# Patient Record
Sex: FEMALE | Race: WHITE | Employment: OTHER | URBAN - METROPOLITAN AREA
[De-identification: names, ages, dates, MRNs, and addresses within clinical notes are randomized per-mention and may not be internally consistent; named-entity substitution may affect disease eponyms.]

---

## 2022-10-14 ENCOUNTER — TELEPHONE (OUTPATIENT)
Dept: GASTROENTEROLOGY | Age: 67
End: 2022-10-14

## 2022-10-14 DIAGNOSIS — Z87.891 FORMER SMOKER: ICD-10-CM

## 2022-10-14 DIAGNOSIS — R12 BURNING REFLUX: ICD-10-CM

## 2022-10-14 PROBLEM — R11.0 NAUSEA: Status: ACTIVE | Noted: 2022-10-14

## 2022-10-14 PROBLEM — K44.9 HIATAL HERNIA: Status: ACTIVE | Noted: 2022-10-14

## 2022-10-14 PROBLEM — E11.9 DIABETES (HCC): Status: ACTIVE | Noted: 2022-10-14

## 2022-10-14 RX ORDER — GLIPIZIDE 5 MG/1
10 TABLET, FILM COATED, EXTENDED RELEASE ORAL DAILY
COMMUNITY
Start: 2022-07-07

## 2022-10-14 NOTE — TELEPHONE ENCOUNTER
Medical records request. I spoke with patient, she said she had been seeing Elena LANDEROS at [facility] in [facility]. Will send request for medical records. She said she was in the hospital 3 yrs ago, and was told she had a hiatal hernia. She is currently c/o stomache burning, and nausea. Is taking OTC meds  for reflux and also taking OTC nicorette. Letter faxed to Elena Garces NP for records.

## 2022-10-18 ENCOUNTER — OFFICE VISIT (OUTPATIENT)
Dept: GASTROENTEROLOGY | Age: 67
End: 2022-10-18

## 2022-10-18 VITALS
HEIGHT: 63 IN | SYSTOLIC BLOOD PRESSURE: 102 MMHG | DIASTOLIC BLOOD PRESSURE: 64 MMHG | OXYGEN SATURATION: 98 % | RESPIRATION RATE: 14 BRPM | HEART RATE: 90 BPM | TEMPERATURE: 97.6 F | WEIGHT: 153.4 LBS | BODY MASS INDEX: 27.18 KG/M2

## 2022-10-18 DIAGNOSIS — K21.9 GASTROESOPHAGEAL REFLUX DISEASE, UNSPECIFIED WHETHER ESOPHAGITIS PRESENT: Primary | ICD-10-CM

## 2022-10-18 DIAGNOSIS — R11.0 NAUSEA: ICD-10-CM

## 2022-10-18 RX ORDER — ASCORBIC ACID 250 MG
250 TABLET ORAL
COMMUNITY

## 2022-10-18 RX ORDER — DIPHENHYDRAMINE HCL 25 MG
4 CAPSULE ORAL AS NEEDED
COMMUNITY

## 2022-10-18 RX ORDER — ASPIRIN 81 MG/1
81 TABLET ORAL DAILY
COMMUNITY

## 2022-10-18 RX ORDER — MAGNESIUM AMINO ACID CHELATE 100 MG
1 TABLET ORAL DAILY
COMMUNITY

## 2022-10-18 RX ORDER — MULTIVIT WITH IRON,MINERALS
TABLET ORAL
COMMUNITY

## 2022-10-18 RX ORDER — OMEPRAZOLE 20 MG/1
20 TABLET, DELAYED RELEASE ORAL DAILY
COMMUNITY

## 2022-10-18 NOTE — PROGRESS NOTES
1. Have you been to the ER, urgent care clinic since your last visit? Hospitalized since your last visit? no    2. Have you seen or consulted any other health care providers outside of the [address] [place] [address] since your last visit? Include any pap smears or colon screening.   No   Chief Complaint   Patient presents with    Heartburn     Acid reflux, says hx of Hiatal Hernia    Nausea    New Patient     Visit Vitals  /64 (BP 1 Location: Left upper arm, BP Patient Position: Sitting, BP Cuff Size: Adult)   Pulse 90   Temp 97.6 °F (36.4 °C) (Temporal)   Resp 14   Ht 5' 3\" (1.6 m)   Wt 69.6 kg (153 lb 6.4 oz)   SpO2 98% Comment: room air   BMI 27.17 kg/m²

## 2022-11-17 ENCOUNTER — HOSPITAL ENCOUNTER (EMERGENCY)
Age: 67
Discharge: HOME | End: 2022-11-17
Attending: EMERGENCY MEDICINE
Payer: MEDICARE

## 2022-11-17 ENCOUNTER — APPOINTMENT (OUTPATIENT)
Dept: GENERAL RADIOLOGY | Age: 67
End: 2022-11-17
Attending: EMERGENCY MEDICINE
Payer: MEDICARE

## 2022-11-17 VITALS
OXYGEN SATURATION: 99 % | SYSTOLIC BLOOD PRESSURE: 148 MMHG | HEIGHT: 63 IN | BODY MASS INDEX: 27.11 KG/M2 | TEMPERATURE: 98.7 F | WEIGHT: 153 LBS | DIASTOLIC BLOOD PRESSURE: 72 MMHG | RESPIRATION RATE: 18 BRPM | HEART RATE: 78 BPM

## 2022-11-17 DIAGNOSIS — J10.1 INFLUENZA A: Primary | ICD-10-CM

## 2022-11-17 LAB
ALBUMIN SERPL-MCNC: 2.9 G/DL (ref 3.5–5)
ALBUMIN/GLOB SERPL: 0.7 {RATIO} (ref 1.1–2.2)
ALP SERPL-CCNC: 88 U/L (ref 45–117)
ALT SERPL-CCNC: 14 U/L (ref 12–78)
ANION GAP SERPL CALC-SCNC: 8 MMOL/L (ref 5–15)
AST SERPL W P-5'-P-CCNC: 12 U/L (ref 15–37)
BASOPHILS # BLD: 0 K/UL (ref 0–0.1)
BASOPHILS NFR BLD: 1 % (ref 0–1)
BILIRUB SERPL-MCNC: 0.2 MG/DL (ref 0.2–1)
BUN SERPL-MCNC: 11 MG/DL (ref 6–20)
BUN/CREAT SERPL: 11 (ref 12–20)
CA-I BLD-MCNC: 8.4 MG/DL (ref 8.5–10.1)
CHLORIDE SERPL-SCNC: 97 MMOL/L (ref 97–108)
CO2 SERPL-SCNC: 32 MMOL/L (ref 21–32)
CREAT SERPL-MCNC: 1.02 MG/DL (ref 0.55–1.02)
DIFFERENTIAL METHOD BLD: ABNORMAL
EOSINOPHIL # BLD: 0 K/UL (ref 0–0.4)
EOSINOPHIL NFR BLD: 0 % (ref 0–7)
ERYTHROCYTE [DISTWIDTH] IN BLOOD BY AUTOMATED COUNT: 13.5 % (ref 11.5–14.5)
FLUAV RNA SPEC QL NAA+PROBE: DETECTED
FLUBV RNA SPEC QL NAA+PROBE: NOT DETECTED
GLOBULIN SER CALC-MCNC: 3.9 G/DL (ref 2–4)
GLUCOSE SERPL-MCNC: 158 MG/DL (ref 65–100)
HCT VFR BLD AUTO: 36.1 % (ref 35–47)
HGB BLD-MCNC: 11.7 G/DL (ref 11.5–16)
IMM GRANULOCYTES # BLD AUTO: 0 K/UL (ref 0–0.04)
IMM GRANULOCYTES NFR BLD AUTO: 0 % (ref 0–0.5)
LYMPHOCYTES # BLD: 0.5 K/UL (ref 0.8–3.5)
LYMPHOCYTES NFR BLD: 13 % (ref 12–49)
MCH RBC QN AUTO: 27.2 PG (ref 26–34)
MCHC RBC AUTO-ENTMCNC: 32.4 G/DL (ref 30–36.5)
MCV RBC AUTO: 84 FL (ref 80–99)
MONOCYTES # BLD: 0.6 K/UL (ref 0–1)
MONOCYTES NFR BLD: 17 % (ref 5–13)
NEUTS SEG # BLD: 2.6 K/UL (ref 1.8–8)
NEUTS SEG NFR BLD: 69 % (ref 32–75)
NRBC # BLD: 0 K/UL (ref 0–0.01)
NRBC BLD-RTO: 0 PER 100 WBC
PLATELET # BLD AUTO: 401 K/UL (ref 150–400)
PMV BLD AUTO: 9 FL (ref 8.9–12.9)
POTASSIUM SERPL-SCNC: 3.3 MMOL/L (ref 3.5–5.1)
PROT SERPL-MCNC: 6.8 G/DL (ref 6.4–8.2)
RBC # BLD AUTO: 4.3 M/UL (ref 3.8–5.2)
RBC MORPH BLD: ABNORMAL
SARS-COV-2, COV2: NOT DETECTED
SODIUM SERPL-SCNC: 137 MMOL/L (ref 136–145)
WBC # BLD AUTO: 3.7 K/UL (ref 3.6–11)

## 2022-11-17 PROCEDURE — 85025 COMPLETE CBC W/AUTO DIFF WBC: CPT

## 2022-11-17 PROCEDURE — 71045 X-RAY EXAM CHEST 1 VIEW: CPT

## 2022-11-17 PROCEDURE — 80053 COMPREHEN METABOLIC PANEL: CPT

## 2022-11-17 PROCEDURE — 87636 SARSCOV2 & INF A&B AMP PRB: CPT

## 2022-11-17 PROCEDURE — 99284 EMERGENCY DEPT VISIT MOD MDM: CPT

## 2022-11-17 PROCEDURE — 74011250636 HC RX REV CODE- 250/636: Performed by: EMERGENCY MEDICINE

## 2022-11-17 PROCEDURE — 74011250637 HC RX REV CODE- 250/637: Performed by: EMERGENCY MEDICINE

## 2022-11-17 PROCEDURE — 36415 COLL VENOUS BLD VENIPUNCTURE: CPT

## 2022-11-17 RX ORDER — OSELTAMIVIR PHOSPHATE 75 MG/1
75 CAPSULE ORAL
Status: COMPLETED | OUTPATIENT
Start: 2022-11-17 | End: 2022-11-17

## 2022-11-17 RX ORDER — MAG HYDROX/ALUMINUM HYD/SIMETH 200-200-20
30 SUSPENSION, ORAL (FINAL DOSE FORM) ORAL
Status: DISCONTINUED | OUTPATIENT
Start: 2022-11-17 | End: 2022-11-17 | Stop reason: HOSPADM

## 2022-11-17 RX ORDER — ONDANSETRON 4 MG/1
4 TABLET, FILM COATED ORAL
Qty: 15 TABLET | Refills: 0 | Status: SHIPPED | OUTPATIENT
Start: 2022-11-17

## 2022-11-17 RX ORDER — OSELTAMIVIR PHOSPHATE 75 MG/1
75 CAPSULE ORAL 2 TIMES DAILY
Qty: 10 CAPSULE | Refills: 0 | Status: SHIPPED | OUTPATIENT
Start: 2022-11-17 | End: 2022-11-22

## 2022-11-17 RX ADMIN — POTASSIUM BICARBONATE 50 MEQ: 782 TABLET, EFFERVESCENT ORAL at 14:00

## 2022-11-17 RX ADMIN — ALUMINUM HYDROXIDE, MAGNESIUM HYDROXIDE, AND SIMETHICONE 30 ML: 200; 200; 20 SUSPENSION ORAL at 12:21

## 2022-11-17 RX ADMIN — OSELTAMIVIR PHOSPHATE 75 MG: 75 CAPSULE ORAL at 14:00

## 2022-11-17 RX ADMIN — SODIUM CHLORIDE 1000 ML: 9 INJECTION, SOLUTION INTRAVENOUS at 12:17

## 2022-11-17 NOTE — ED TRIAGE NOTES
Patient  reports they think that she may have covid, pnuemoia or flu due to having a deep cough in her chest with nausea vomiting that started yesterday

## 2022-11-17 NOTE — ED PROVIDER NOTES
EMERGENCY DEPARTMENT HISTORY AND PHYSICAL EXAM      Date: 11/17/2022  Patient Name: Sally Owens    History of Presenting Illness     Chief Complaint   Patient presents with    Nausea       History Provided By: Patient's     HPI: Sally Owens, 75 y.o. female past medical history significant diabetes, patient presents with complaint of cough started yesterday and has had nausea and vomiting with 2 episodes of vomiting since yesterday, patient denies any abdominal pain denies any shortness of breath, no fever no chills    There are no other complaints, changes, or physical findings at this time. PCP: TIGRE Hauser    No current facility-administered medications on file prior to encounter. Current Outpatient Medications on File Prior to Encounter   Medication Sig Dispense Refill    ascorbic acid, vitamin C, (VITAMIN C) 250 mg tablet Take 250 mg by mouth. aspirin delayed-release 81 mg tablet Take 81 mg by mouth daily. She says she takes 2 a day      Magnesium Amino Acid Chelate 100 mg tab Take 1 Tablet by mouth daily. cinnamon bark (CINNAMON PO) Take 1,200 mg by mouth daily. omeprazole (PRILOSEC OTC) 20 mg tablet Take 20 mg by mouth daily. potassium gluconate 2.5 mEq tab potassium 99 mg tablet   Take 1 tablet every day by oral route. nicotine (NICORETTE) 4 mg gum Take 4 mg by mouth as needed for Smoking Cessation. glipiZIDE SR (GLUCOTROL XL) 5 mg CR tablet Take 10 mg by mouth daily. Past History     Past Medical History:  Past Medical History:   Diagnosis Date    Burning reflux 10/14/2022    Diabetes ([address] 10/14/2022    Former smoker 10/14/2022    Using OTC Nicorette. Hiatal hernia 10/14/2022    Nausea 10/14/2022       Past Surgical History:  No past surgical history on file.     Family History:  Family History   Problem Relation Age of Onset    Diabetes Mother     Heart Disease Mother     Hypertension Mother     Heart Disease Father     Hypertension Father     Diabetes Sister     Diabetes Maternal Grandmother        Social History:  Social History     Tobacco Use    Smoking status: Former     Types: Cigarettes    Smokeless tobacco: Never   Vaping Use    Vaping Use: Never used   Substance Use Topics    Alcohol use: Not Currently       Allergies: Allergies   Allergen Reactions    Codeine Unknown (comments)    Penicillins Other (comments)       Review of Systems   Review of Systems   Constitutional:  Negative for chills and fever. HENT:  Negative for rhinorrhea and sore throat. Eyes:  Negative for pain and visual disturbance. Respiratory:  Positive for cough. Negative for shortness of breath. Cardiovascular:  Negative for chest pain and leg swelling. Gastrointestinal:  Positive for diarrhea, nausea and vomiting. Negative for abdominal pain. Endocrine: Negative for polydipsia and polyuria. Genitourinary:  Negative for dysuria and hematuria. Musculoskeletal:  Negative for back pain and neck pain. Skin:  Negative for color change and pallor. Neurological:  Negative for weakness and headaches. Psychiatric/Behavioral:  Negative for agitation and suicidal ideas. Physical Exam   Physical Exam  Vitals and nursing note reviewed. Constitutional:       General: She is not in acute distress. Appearance: She is ill-appearing. She is not toxic-appearing or diaphoretic. HENT:      Head: Normocephalic and atraumatic. Right Ear: Tympanic membrane normal.      Left Ear: Tympanic membrane normal.      Nose: Nose normal. No congestion. Mouth/Throat:      Mouth: Mucous membranes are moist.      Pharynx: Oropharynx is clear. Eyes:      Extraocular Movements: Extraocular movements intact. Conjunctiva/sclera: Conjunctivae normal.      Pupils: Pupils are equal, round, and reactive to light. Cardiovascular:      Rate and Rhythm: Normal rate and regular rhythm. Pulses: Normal pulses. Heart sounds: Normal heart sounds. Pulmonary:      Effort: Pulmonary effort is normal.      Breath sounds: Normal breath sounds. Abdominal:      General: Bowel sounds are normal.      Palpations: Abdomen is soft. Tenderness: There is no abdominal tenderness. Musculoskeletal:         General: No tenderness, deformity or signs of injury. Normal range of motion. Cervical back: Normal range of motion and neck supple. No rigidity or tenderness. Lymphadenopathy:      Cervical: No cervical adenopathy. Skin:     General: Skin is warm and dry. Capillary Refill: Capillary refill takes less than 2 seconds. Findings: No rash. Neurological:      General: No focal deficit present. Mental Status: She is alert and oriented to person, place, and time. Cranial Nerves: No cranial nerve deficit. Sensory: No sensory deficit. Psychiatric:         Mood and Affect: Mood normal.         Behavior: Behavior normal.       Lab and Diagnostic Study Results   Labs -   No results found for this or any previous visit (from the past 12 hour(s)). Radiologic Studies -   @lastxrresult@  CT Results  (Last 48 hours)      None          CXR Results  (Last 48 hours)      None            Medical Decision Making and ED Course   Differential Diagnosis & Medical Decision Making Provider Note:   Vomiting DDx bowel obstruction, Pancreatitis, pneumonia    - I am the first provider for this patient. I reviewed the vital signs, available nursing notes, past medical history, past surgical history, family history and social history. The patients presenting problems have been discussed, and they are in agreement with the care plan formulated and outlined with them. I have encouraged them to ask questions as they arise throughout their visit. Vital Signs-Reviewed the patient's vital signs.   Patient Vitals for the past 12 hrs:   Temp Pulse Resp BP SpO2   11/17/22 0954 98.7 °F (37.1 °C) 83 18 113/65 97 %       ED Course:   ED Course as of 11/17/22 [facility] Nov 17, 2022   1252 Awaiting completion of COVID-19 results [SB]      ED Course User Index  [SB] Jaimee Rosenbaum MD         Procedures   Performed by: Hilary Severs, MD  Procedures      Disposition   Disposition: Condition stable and improved  DC- Adult Discharges: All of the diagnostic tests were reviewed and questions answered. Diagnosis, care plan and treatment options were discussed. The patient understands the instructions and will follow up as directed. The patients results have been reviewed with them. They have been counseled regarding their diagnosis. The patient and spouse/SO verbally convey understanding and agreement of the signs, symptoms, diagnosis, treatment and prognosis and additionally agrees to follow up as recommended with their PCP in 24 - 48 hours. They also agree with the care-plan and convey that all of their questions have been answered. I have also put together some discharge instructions for them that include: 1) educational information regarding their diagnosis, 2) how to care for their diagnosis at home, as well a 3) list of reasons why they would want to return to the ED prior to their follow-up appointment, should their condition change. DISCHARGE PLAN:  1. Current Discharge Medication List        CONTINUE these medications which have NOT CHANGED    Details   ascorbic acid, vitamin C, (VITAMIN C) 250 mg tablet Take 250 mg by mouth. aspirin delayed-release 81 mg tablet Take 81 mg by mouth daily. She says she takes 2 a day      Magnesium Amino Acid Chelate 100 mg tab Take 1 Tablet by mouth daily. cinnamon bark (CINNAMON PO) Take 1,200 mg by mouth daily. omeprazole (PRILOSEC OTC) 20 mg tablet Take 20 mg by mouth daily. potassium gluconate 2.5 mEq tab potassium 99 mg tablet   Take 1 tablet every day by oral route. nicotine (NICORETTE) 4 mg gum Take 4 mg by mouth as needed for Smoking Cessation.       glipiZIDE SR (GLUCOTROL XL) 5 mg CR tablet Take 10 mg by mouth daily. 2.   Follow-up Information    None       3. Return to ED if worse   4. Current Discharge Medication List         Remove if admitted/transferred    Diagnosis/Clinical Impression     Clinical Impression: No diagnosis found. Attestations: Marianela MCINTOSH MD, am the primary clinician of record. Please note that this dictation was completed with [employer], the computer voice recognition software. Quite often unanticipated grammatical, syntax, homophones, and other interpretive errors are inadvertently transcribed by the computer software. Please disregard these errors. Please excuse any errors that have escaped final proofreading. Thank you.

## 2022-12-07 ENCOUNTER — APPOINTMENT (OUTPATIENT)
Dept: GENERAL RADIOLOGY | Age: 67
End: 2022-12-07
Attending: EMERGENCY MEDICINE
Payer: MEDICARE

## 2022-12-07 ENCOUNTER — HOSPITAL ENCOUNTER (INPATIENT)
Age: 67
LOS: 2 days | Discharge: HOME HEALTH | End: 2022-12-09
Attending: EMERGENCY MEDICINE | Admitting: HOSPITALIST
Payer: MEDICARE

## 2022-12-07 DIAGNOSIS — J10.1 INFLUENZA A: ICD-10-CM

## 2022-12-07 DIAGNOSIS — R53.81 MALAISE AND FATIGUE: ICD-10-CM

## 2022-12-07 DIAGNOSIS — E87.8 ELECTROLYTE ABNORMALITY: ICD-10-CM

## 2022-12-07 DIAGNOSIS — R53.83 MALAISE AND FATIGUE: ICD-10-CM

## 2022-12-07 DIAGNOSIS — E86.0 DEHYDRATION: Primary | ICD-10-CM

## 2022-12-07 PROBLEM — E83.42 HYPOMAGNESEMIA: Status: ACTIVE | Noted: 2022-12-07

## 2022-12-07 PROBLEM — E87.6 HYPOKALEMIA: Status: ACTIVE | Noted: 2022-12-07

## 2022-12-07 PROBLEM — E88.09 HYPOALBUMINEMIA: Status: ACTIVE | Noted: 2022-12-07

## 2022-12-07 PROBLEM — N39.0 UTI (URINARY TRACT INFECTION): Status: ACTIVE | Noted: 2022-12-07

## 2022-12-07 PROBLEM — R53.1 GENERAL WEAKNESS: Status: ACTIVE | Noted: 2022-12-07

## 2022-12-07 PROBLEM — D72.829 LEUCOCYTOSIS: Status: ACTIVE | Noted: 2022-12-07

## 2022-12-07 LAB
ALBUMIN SERPL-MCNC: 2.2 G/DL (ref 3.5–5)
ALBUMIN/GLOB SERPL: 0.4 {RATIO} (ref 1.1–2.2)
ALP SERPL-CCNC: 136 U/L (ref 45–117)
ALT SERPL-CCNC: 12 U/L (ref 12–78)
ANION GAP SERPL CALC-SCNC: 6 MMOL/L (ref 5–15)
APPEARANCE UR: CLEAR
AST SERPL W P-5'-P-CCNC: 12 U/L (ref 15–37)
BACTERIA URNS QL MICRO: ABNORMAL /HPF
BASOPHILS # BLD: 0 K/UL (ref 0–0.1)
BASOPHILS NFR BLD: 0 % (ref 0–1)
BILIRUB SERPL-MCNC: 0.5 MG/DL (ref 0.2–1)
BILIRUB UR QL: NEGATIVE
BNP SERPL-MCNC: 531 PG/ML
BUN SERPL-MCNC: 11 MG/DL (ref 6–20)
BUN/CREAT SERPL: 14 (ref 12–20)
CA-I BLD-MCNC: 8.8 MG/DL (ref 8.5–10.1)
CHLORIDE SERPL-SCNC: 92 MMOL/L (ref 97–108)
CO2 SERPL-SCNC: 33 MMOL/L (ref 21–32)
COLOR UR: ABNORMAL
CREAT SERPL-MCNC: 0.78 MG/DL (ref 0.55–1.02)
DIFFERENTIAL METHOD BLD: ABNORMAL
EOSINOPHIL # BLD: 0 K/UL (ref 0–0.4)
EOSINOPHIL NFR BLD: 0 % (ref 0–7)
ERYTHROCYTE [DISTWIDTH] IN BLOOD BY AUTOMATED COUNT: 13.8 % (ref 11.5–14.5)
EST. AVERAGE GLUCOSE BLD GHB EST-MCNC: 260 MG/DL
FLUAV AG NPH QL IA: NEGATIVE
FLUAV RNA SPEC QL NAA+PROBE: NOT DETECTED
FLUBV AG NOSE QL IA: NEGATIVE
FLUBV RNA SPEC QL NAA+PROBE: NOT DETECTED
GLOBULIN SER CALC-MCNC: 5.1 G/DL (ref 2–4)
GLUCOSE BLD STRIP.AUTO-MCNC: 211 MG/DL (ref 65–100)
GLUCOSE SERPL-MCNC: 186 MG/DL (ref 65–100)
GLUCOSE UR STRIP.AUTO-MCNC: 250 MG/DL
HBA1C MFR BLD: 10.7 % (ref 4–5.6)
HCT VFR BLD AUTO: 34.4 % (ref 35–47)
HGB BLD-MCNC: 11.1 G/DL (ref 11.5–16)
HGB UR QL STRIP: NEGATIVE
IMM GRANULOCYTES # BLD AUTO: 0.1 K/UL (ref 0–0.04)
IMM GRANULOCYTES NFR BLD AUTO: 1 % (ref 0–0.5)
KETONES UR QL STRIP.AUTO: NEGATIVE MG/DL
LACTATE SERPL-SCNC: 1.4 MMOL/L (ref 0.4–2)
LEUKOCYTE ESTERASE UR QL STRIP.AUTO: ABNORMAL
LYMPHOCYTES # BLD: 2.5 K/UL (ref 0.8–3.5)
LYMPHOCYTES NFR BLD: 17 % (ref 12–49)
MAGNESIUM SERPL-MCNC: 1.1 MG/DL (ref 1.6–2.4)
MCH RBC QN AUTO: 27.1 PG (ref 26–34)
MCHC RBC AUTO-ENTMCNC: 32.3 G/DL (ref 30–36.5)
MCV RBC AUTO: 83.9 FL (ref 80–99)
MONOCYTES # BLD: 1.2 K/UL (ref 0–1)
MONOCYTES NFR BLD: 8 % (ref 5–13)
NEUTS SEG # BLD: 10.9 K/UL (ref 1.8–8)
NEUTS SEG NFR BLD: 74 % (ref 32–75)
NITRITE UR QL STRIP.AUTO: NEGATIVE
NRBC # BLD: 0 K/UL (ref 0–0.01)
NRBC BLD-RTO: 0 PER 100 WBC
PERFORMED BY, TECHID: ABNORMAL
PH UR STRIP: 7 [PH] (ref 5–8)
PLATELET # BLD AUTO: 590 K/UL (ref 150–400)
PMV BLD AUTO: 8.5 FL (ref 8.9–12.9)
POTASSIUM SERPL-SCNC: 2.9 MMOL/L (ref 3.5–5.1)
PROT SERPL-MCNC: 7.3 G/DL (ref 6.4–8.2)
PROT UR STRIP-MCNC: 30 MG/DL
RBC # BLD AUTO: 4.1 M/UL (ref 3.8–5.2)
RBC #/AREA URNS HPF: ABNORMAL /HPF (ref 0–3)
RSV AG NPH QL IA: POSITIVE
SARS-COV-2, COV2: NOT DETECTED
SODIUM SERPL-SCNC: 131 MMOL/L (ref 136–145)
SP GR UR REFRACTOMETRY: 1.01 (ref 1–1.03)
TROPONIN-HIGH SENSITIVITY: 10 NG/L (ref 0–51)
UA: UC IF INDICATED,UAUC: ABNORMAL
UROBILINOGEN UR QL STRIP.AUTO: 1 EU/DL (ref 0.2–1)
WBC # BLD AUTO: 14.7 K/UL (ref 3.6–11)
WBC URNS QL MICRO: ABNORMAL /HPF (ref 0–5)

## 2022-12-07 PROCEDURE — 96374 THER/PROPH/DIAG INJ IV PUSH: CPT

## 2022-12-07 PROCEDURE — 74011636637 HC RX REV CODE- 636/637: Performed by: HOSPITALIST

## 2022-12-07 PROCEDURE — 84484 ASSAY OF TROPONIN QUANT: CPT

## 2022-12-07 PROCEDURE — 81001 URINALYSIS AUTO W/SCOPE: CPT

## 2022-12-07 PROCEDURE — 87086 URINE CULTURE/COLONY COUNT: CPT

## 2022-12-07 PROCEDURE — 74011250637 HC RX REV CODE- 250/637: Performed by: HOSPITALIST

## 2022-12-07 PROCEDURE — 74011250636 HC RX REV CODE- 250/636: Performed by: HOSPITALIST

## 2022-12-07 PROCEDURE — 83735 ASSAY OF MAGNESIUM: CPT

## 2022-12-07 PROCEDURE — 71045 X-RAY EXAM CHEST 1 VIEW: CPT

## 2022-12-07 PROCEDURE — 74011250637 HC RX REV CODE- 250/637: Performed by: EMERGENCY MEDICINE

## 2022-12-07 PROCEDURE — 85025 COMPLETE CBC W/AUTO DIFF WBC: CPT

## 2022-12-07 PROCEDURE — 65270000029 HC RM PRIVATE

## 2022-12-07 PROCEDURE — 99285 EMERGENCY DEPT VISIT HI MDM: CPT

## 2022-12-07 PROCEDURE — 74011000250 HC RX REV CODE- 250: Performed by: HOSPITALIST

## 2022-12-07 PROCEDURE — 74011250636 HC RX REV CODE- 250/636: Performed by: EMERGENCY MEDICINE

## 2022-12-07 PROCEDURE — 82962 GLUCOSE BLOOD TEST: CPT

## 2022-12-07 PROCEDURE — 87804 INFLUENZA ASSAY W/OPTIC: CPT

## 2022-12-07 PROCEDURE — 87807 RSV ASSAY W/OPTIC: CPT

## 2022-12-07 PROCEDURE — 83036 HEMOGLOBIN GLYCOSYLATED A1C: CPT

## 2022-12-07 PROCEDURE — 87040 BLOOD CULTURE FOR BACTERIA: CPT

## 2022-12-07 PROCEDURE — 83605 ASSAY OF LACTIC ACID: CPT

## 2022-12-07 PROCEDURE — 83880 ASSAY OF NATRIURETIC PEPTIDE: CPT

## 2022-12-07 PROCEDURE — 87636 SARSCOV2 & INF A&B AMP PRB: CPT

## 2022-12-07 PROCEDURE — 93005 ELECTROCARDIOGRAM TRACING: CPT

## 2022-12-07 PROCEDURE — 80053 COMPREHEN METABOLIC PANEL: CPT

## 2022-12-07 RX ORDER — LANOLIN ALCOHOL/MO/W.PET/CERES
400 CREAM (GRAM) TOPICAL DAILY
COMMUNITY

## 2022-12-07 RX ORDER — DIPHENHYDRAMINE HYDROCHLORIDE 50 MG/ML
25 INJECTION, SOLUTION INTRAMUSCULAR; INTRAVENOUS
Status: COMPLETED | OUTPATIENT
Start: 2022-12-07 | End: 2022-12-07

## 2022-12-07 RX ORDER — SODIUM CHLORIDE 0.9 % (FLUSH) 0.9 %
5-40 SYRINGE (ML) INJECTION AS NEEDED
Status: DISCONTINUED | OUTPATIENT
Start: 2022-12-07 | End: 2022-12-09 | Stop reason: HOSPADM

## 2022-12-07 RX ORDER — POLYETHYLENE GLYCOL 3350 17 G/17G
17 POWDER, FOR SOLUTION ORAL DAILY PRN
Status: DISCONTINUED | OUTPATIENT
Start: 2022-12-07 | End: 2022-12-09 | Stop reason: HOSPADM

## 2022-12-07 RX ORDER — SODIUM CHLORIDE AND POTASSIUM CHLORIDE 150; 900 MG/100ML; MG/100ML
INJECTION, SOLUTION INTRAVENOUS CONTINUOUS
Status: DISCONTINUED | OUTPATIENT
Start: 2022-12-07 | End: 2022-12-09

## 2022-12-07 RX ORDER — METFORMIN HYDROCHLORIDE 1000 MG/1
1000 TABLET ORAL 2 TIMES DAILY WITH MEALS
COMMUNITY

## 2022-12-07 RX ORDER — LANOLIN ALCOHOL/MO/W.PET/CERES
400 CREAM (GRAM) TOPICAL DAILY
Status: DISCONTINUED | OUTPATIENT
Start: 2022-12-08 | End: 2022-12-07

## 2022-12-07 RX ORDER — METOCLOPRAMIDE HYDROCHLORIDE 5 MG/ML
5 INJECTION INTRAMUSCULAR; INTRAVENOUS EVERY 6 HOURS
Status: DISCONTINUED | OUTPATIENT
Start: 2022-12-07 | End: 2022-12-07

## 2022-12-07 RX ORDER — PANTOPRAZOLE SODIUM 40 MG/1
40 TABLET, DELAYED RELEASE ORAL
Status: DISCONTINUED | OUTPATIENT
Start: 2022-12-08 | End: 2022-12-09 | Stop reason: HOSPADM

## 2022-12-07 RX ORDER — ACETAMINOPHEN 325 MG/1
650 TABLET ORAL
Status: DISCONTINUED | OUTPATIENT
Start: 2022-12-07 | End: 2022-12-09 | Stop reason: HOSPADM

## 2022-12-07 RX ORDER — ACETAMINOPHEN 500 MG
1000 TABLET ORAL ONCE
Status: COMPLETED | OUTPATIENT
Start: 2022-12-07 | End: 2022-12-07

## 2022-12-07 RX ORDER — INSULIN LISPRO 100 [IU]/ML
INJECTION, SOLUTION INTRAVENOUS; SUBCUTANEOUS
Status: DISCONTINUED | OUTPATIENT
Start: 2022-12-07 | End: 2022-12-07

## 2022-12-07 RX ORDER — MAGNESIUM SULFATE 100 %
4 CRYSTALS MISCELLANEOUS AS NEEDED
Status: DISCONTINUED | OUTPATIENT
Start: 2022-12-07 | End: 2022-12-09 | Stop reason: HOSPADM

## 2022-12-07 RX ORDER — DEXTROSE 50 % IN WATER (D50W) INTRAVENOUS SYRINGE
25-50 AS NEEDED
Status: DISCONTINUED | OUTPATIENT
Start: 2022-12-07 | End: 2022-12-09 | Stop reason: HOSPADM

## 2022-12-07 RX ORDER — FLUTICASONE PROPIONATE 50 MCG
2 SPRAY, SUSPENSION (ML) NASAL DAILY
Status: DISCONTINUED | OUTPATIENT
Start: 2022-12-07 | End: 2022-12-09 | Stop reason: HOSPADM

## 2022-12-07 RX ORDER — POTASSIUM CHLORIDE 20 MEQ/1
40 TABLET, EXTENDED RELEASE ORAL
Status: COMPLETED | OUTPATIENT
Start: 2022-12-07 | End: 2022-12-07

## 2022-12-07 RX ORDER — LEVOFLOXACIN 5 MG/ML
750 INJECTION, SOLUTION INTRAVENOUS EVERY 24 HOURS
Status: DISCONTINUED | OUTPATIENT
Start: 2022-12-07 | End: 2022-12-09 | Stop reason: HOSPADM

## 2022-12-07 RX ORDER — MAG HYDROX/ALUMINUM HYD/SIMETH 200-200-20
30 SUSPENSION, ORAL (FINAL DOSE FORM) ORAL
Status: DISCONTINUED | OUTPATIENT
Start: 2022-12-08 | End: 2022-12-09 | Stop reason: HOSPADM

## 2022-12-07 RX ORDER — ATORVASTATIN CALCIUM 10 MG/1
10 TABLET, FILM COATED ORAL DAILY
Status: DISCONTINUED | OUTPATIENT
Start: 2022-12-08 | End: 2022-12-09 | Stop reason: HOSPADM

## 2022-12-07 RX ORDER — LANOLIN ALCOHOL/MO/W.PET/CERES
400 CREAM (GRAM) TOPICAL 2 TIMES DAILY
Status: DISCONTINUED | OUTPATIENT
Start: 2022-12-07 | End: 2022-12-09 | Stop reason: HOSPADM

## 2022-12-07 RX ORDER — MELATONIN
1000 DAILY
COMMUNITY

## 2022-12-07 RX ORDER — SODIUM CHLORIDE 0.9 % (FLUSH) 0.9 %
5-40 SYRINGE (ML) INJECTION EVERY 8 HOURS
Status: DISCONTINUED | OUTPATIENT
Start: 2022-12-07 | End: 2022-12-09 | Stop reason: HOSPADM

## 2022-12-07 RX ORDER — MORPHINE SULFATE 2 MG/ML
2 INJECTION, SOLUTION INTRAMUSCULAR; INTRAVENOUS ONCE
Status: DISCONTINUED | OUTPATIENT
Start: 2022-12-07 | End: 2022-12-07

## 2022-12-07 RX ORDER — ACETAMINOPHEN 650 MG/1
650 SUPPOSITORY RECTAL
Status: DISCONTINUED | OUTPATIENT
Start: 2022-12-07 | End: 2022-12-09 | Stop reason: HOSPADM

## 2022-12-07 RX ORDER — CETIRIZINE HYDROCHLORIDE 10 MG/1
10 TABLET ORAL EVERY EVENING
Status: DISCONTINUED | OUTPATIENT
Start: 2022-12-07 | End: 2022-12-09 | Stop reason: HOSPADM

## 2022-12-07 RX ORDER — METFORMIN HYDROCHLORIDE 500 MG/1
1000 TABLET ORAL 2 TIMES DAILY WITH MEALS
Status: DISCONTINUED | OUTPATIENT
Start: 2022-12-07 | End: 2022-12-09 | Stop reason: HOSPADM

## 2022-12-07 RX ORDER — ENOXAPARIN SODIUM 100 MG/ML
40 INJECTION SUBCUTANEOUS DAILY
Status: DISCONTINUED | OUTPATIENT
Start: 2022-12-08 | End: 2022-12-09 | Stop reason: HOSPADM

## 2022-12-07 RX ORDER — METOPROLOL SUCCINATE 25 MG/1
25 TABLET, EXTENDED RELEASE ORAL DAILY
Status: DISCONTINUED | OUTPATIENT
Start: 2022-12-08 | End: 2022-12-09 | Stop reason: HOSPADM

## 2022-12-07 RX ORDER — MAGNESIUM SULFATE 1 G/100ML
1 INJECTION INTRAVENOUS
Status: COMPLETED | OUTPATIENT
Start: 2022-12-07 | End: 2022-12-07

## 2022-12-07 RX ORDER — MELATONIN
1000 DAILY
Status: DISCONTINUED | OUTPATIENT
Start: 2022-12-08 | End: 2022-12-09 | Stop reason: HOSPADM

## 2022-12-07 RX ORDER — LIDOCAINE HYDROCHLORIDE 20 MG/ML
10 INJECTION, SOLUTION EPIDURAL; INFILTRATION; INTRACAUDAL; PERINEURAL ONCE
Status: DISCONTINUED | OUTPATIENT
Start: 2022-12-07 | End: 2022-12-07

## 2022-12-07 RX ORDER — ONDANSETRON 2 MG/ML
4 INJECTION INTRAMUSCULAR; INTRAVENOUS
Status: DISCONTINUED | OUTPATIENT
Start: 2022-12-07 | End: 2022-12-09 | Stop reason: HOSPADM

## 2022-12-07 RX ORDER — METOPROLOL SUCCINATE 25 MG/1
25 TABLET, EXTENDED RELEASE ORAL DAILY
COMMUNITY

## 2022-12-07 RX ORDER — BENZONATATE 100 MG/1
100 CAPSULE ORAL
Status: DISCONTINUED | OUTPATIENT
Start: 2022-12-07 | End: 2022-12-09 | Stop reason: HOSPADM

## 2022-12-07 RX ORDER — OMEPRAZOLE 20 MG/1
20 CAPSULE, DELAYED RELEASE ORAL DAILY
COMMUNITY

## 2022-12-07 RX ORDER — ONDANSETRON 2 MG/ML
4 INJECTION INTRAMUSCULAR; INTRAVENOUS ONCE
Status: COMPLETED | OUTPATIENT
Start: 2022-12-07 | End: 2022-12-07

## 2022-12-07 RX ORDER — BISMUTH SUBSALICYLATE 262 MG
1 TABLET,CHEWABLE ORAL DAILY
COMMUNITY

## 2022-12-07 RX ORDER — ATORVASTATIN CALCIUM 10 MG/1
10 TABLET, FILM COATED ORAL DAILY
COMMUNITY

## 2022-12-07 RX ADMIN — METFORMIN HYDROCHLORIDE 1000 MG: 500 TABLET ORAL at 16:47

## 2022-12-07 RX ADMIN — MAGNESIUM OXIDE 400 MG: 400 TABLET ORAL at 21:41

## 2022-12-07 RX ADMIN — CETIRIZINE HYDROCHLORIDE 10 MG: 10 TABLET, FILM COATED ORAL at 17:08

## 2022-12-07 RX ADMIN — MAGNESIUM SULFATE HEPTAHYDRATE 1 G: 1 INJECTION, SOLUTION INTRAVENOUS at 21:43

## 2022-12-07 RX ADMIN — FLUTICASONE PROPIONATE 2 SPRAY: 50 SPRAY, METERED NASAL at 16:52

## 2022-12-07 RX ADMIN — ONDANSETRON 4 MG: 2 INJECTION INTRAMUSCULAR; INTRAVENOUS at 10:35

## 2022-12-07 RX ADMIN — SODIUM CHLORIDE, PRESERVATIVE FREE 10 ML: 5 INJECTION INTRAVENOUS at 21:43

## 2022-12-07 RX ADMIN — POTASSIUM CHLORIDE AND SODIUM CHLORIDE: 900; 150 INJECTION, SOLUTION INTRAVENOUS at 14:21

## 2022-12-07 RX ADMIN — DIPHENHYDRAMINE HYDROCHLORIDE 25 MG: 50 INJECTION, SOLUTION INTRAMUSCULAR; INTRAVENOUS at 12:41

## 2022-12-07 RX ADMIN — METOCLOPRAMIDE 5 MG: 5 INJECTION, SOLUTION INTRAMUSCULAR; INTRAVENOUS at 12:41

## 2022-12-07 RX ADMIN — SODIUM CHLORIDE, PRESERVATIVE FREE 10 ML: 5 INJECTION INTRAVENOUS at 16:24

## 2022-12-07 RX ADMIN — LEVOFLOXACIN 750 MG: 750 INJECTION, SOLUTION INTRAVENOUS at 16:24

## 2022-12-07 RX ADMIN — POTASSIUM CHLORIDE 40 MEQ: 1500 TABLET, EXTENDED RELEASE ORAL at 12:37

## 2022-12-07 RX ADMIN — ACETAMINOPHEN 1000 MG: 500 TABLET ORAL at 10:43

## 2022-12-07 RX ADMIN — SODIUM CHLORIDE 500 ML: 9 INJECTION, SOLUTION INTRAVENOUS at 12:37

## 2022-12-07 RX ADMIN — SODIUM CHLORIDE, PRESERVATIVE FREE 10 ML: 5 INJECTION INTRAVENOUS at 14:22

## 2022-12-07 RX ADMIN — METOCLOPRAMIDE 5 MG: 5 INJECTION, SOLUTION INTRAMUSCULAR; INTRAVENOUS at 17:08

## 2022-12-07 RX ADMIN — INSULIN LISPRO 3 UNITS: 100 INJECTION, SOLUTION INTRAVENOUS; SUBCUTANEOUS at 15:38

## 2022-12-07 RX ADMIN — BENZONATATE 100 MG: 100 CAPSULE ORAL at 10:35

## 2022-12-07 RX ADMIN — MAGNESIUM SULFATE HEPTAHYDRATE 1 G: 1 INJECTION, SOLUTION INTRAVENOUS at 12:37

## 2022-12-07 NOTE — ED NOTES
TRANSFER - OUT REPORT:    Verbal report given to Gloria  on Serina Guerra  being transferred to [facility] for routine progression of care       Report consisted of patients Situation, Background, Assessment and   Recommendations(SBAR). Information from the following report(s) ED Summary was reviewed with the receiving nurse. Opportunity for questions and clarification was provided.       Patient transported with:   Registered Nurse

## 2022-12-07 NOTE — ROUTINE PROCESS
TRANSFER - IN REPORT:    Verbal report received from Yasmine(name) on [facility]  being received from ER(unit) for routine progression of care      Report consisted of patients Situation, Background, Assessment and   Recommendations(SBAR). Information from the following report(s) SBAR was reviewed with the receiving nurse. Opportunity for questions and clarification was provided. Assessment completed upon patients arrival to unit and care assumed.

## 2022-12-07 NOTE — H&P
History and Physical      Chief Complaints:     Chief Complaint   Patient presents with    Fatigue    Flu Like Symptoms         Subjective:     Serina Guerra is a 75 y.o. female followed by Teri Bermudez  and  has a past medical history of   GERD, hiatal hernia, diabetes, hypertension, hyperlipidemia who presents from home with increasing fatigue. Patient was diagnosed with flu a positive on 11/17 and since that time has had fatigue and aching all over denies any fevers but is having some cough and sinus pain as well as nausea with associated heartburn-like symptoms. She had some episodes of diarrhea but very few and she also notes that her  had similar symptoms and found to be flu positive but is doing better. On evaluation emergency room she noted to have a sodium 131 potassium 2.9 and magnesium of 1.1 albumin of 2.2 and a BUN/creatinine 11/0.7 8 repeat COVID/flu testing is negative her chest x-ray was also negative but with her noted leukocytosis initially urinalysis obtained after arrival to the care and noted to have 2+ bacteria and moderate leukocytes increased protein and glucose also noted. So patient was referred for admission to remote telemetry for UTI, hypomagnesemia, dehydration, hypokalemia generalized weakness      Past Medical History:   Diagnosis Date    Burning reflux 10/14/2022    Diabetes ([facility] [address] 10/14/2022    Former smoker 10/14/2022    Using OTC Nicorette. Hiatal hernia 10/14/2022    Nausea 10/14/2022      No past surgical history on file.   Family History   Problem Relation Age of Onset    Diabetes Mother     Heart Disease Mother     Hypertension Mother     Heart Disease Father     Hypertension Father     Diabetes Sister     Diabetes Maternal Grandmother       Social History     Tobacco Use    Smoking status: Former     Types: Cigarettes    Smokeless tobacco: Never   Substance Use Topics    Alcohol use: Not Currently       Prior to Admission medications    Medication Sig Start Date End Date Taking? Authorizing Provider   magnesium oxide (MAG-OX) 400 mg tablet Take 400 mg by mouth daily. Yes Provider, Historical   omeprazole (PRILOSEC) 20 mg capsule Take 20 mg by mouth daily. Indications: gastroesophageal reflux disease   Yes Provider, Historical   multivitamin (ONE A DAY) tablet Take 1 Tablet by mouth daily. Yes Provider, Historical   cholecalciferol (Vitamin D3) (1000 Units /25 mcg) tablet Take 1,000 Units by mouth daily. Yes Provider, Historical   metFORMIN (GLUCOPHAGE) 1,000 mg tablet Take 1,000 mg by mouth two (2) times daily (with meals). Rx ending 12/12/22   Yes Provider, Historical   atorvastatin (LIPITOR) 10 mg tablet Take 10 mg by mouth daily. Yes Provider, Historical   metoprolol succinate (TOPROL-XL) 25 mg XL tablet Take 25 mg by mouth daily. Yes Provider, Historical   omeprazole (PRILOSEC OTC) 20 mg tablet Take 20 mg by mouth daily. Yes Provider, Historical   nicotine (NICORETTE) 4 mg gum Take 4 mg by mouth as needed for Smoking Cessation. Yes Provider, Historical   glipiZIDE SR (GLUCOTROL XL) 5 mg CR tablet Take 10 mg by mouth daily. 7/7/22  Yes Provider, Historical   ondansetron hcl (Zofran) 4 mg tablet Take 1 Tablet by mouth every eight (8) hours as needed for Nausea or Vomiting. Patient not taking: Reported on 12/7/2022 11/17/22   Melanie Celis MD   ascorbic acid, vitamin C, (VITAMIN C) 250 mg tablet Take 250 mg by mouth. Patient not taking: Reported on 12/7/2022    Provider, Historical   aspirin delayed-release 81 mg tablet Take 81 mg by mouth daily. She says she takes 2 a day  Patient not taking: Reported on 12/7/2022    Provider, Historical   Magnesium Amino Acid Chelate 100 mg tab Take 1 Tablet by mouth daily. Patient not taking: Reported on 12/7/2022    Provider, Historical   cinnamon bark (CINNAMON PO) Take 1,200 mg by mouth daily.     Provider, Historical   potassium gluconate 2.5 mEq tab potassium 99 mg tablet   Take 1 tablet every day by oral route. Patient not taking: Reported on 12/7/2022    Provider, Historical     Allergies   Allergen Reactions    Codeine Unknown (comments)    Penicillins Other (comments)        Review of Systems:  Review of Systems   Constitutional:  Positive for appetite change and fatigue. HENT: Negative. Respiratory:  Positive for cough. Cardiovascular: Negative. Gastrointestinal:  Positive for abdominal pain and nausea. Endocrine: Negative. Genitourinary: Negative. Musculoskeletal: Negative. Allergic/Immunologic: Negative. Neurological: Negative. Hematological: Negative. Psychiatric/Behavioral: Negative. All other systems reviewed and are negative. Objective:     Vitals:  Visit Vitals  /75   Pulse 86   Temp 98.4 °F (36.9 °C)   Resp 18   Ht 5' 3\" (1.6 m)   Wt 66.1 kg (145 lb 11.2 oz)   SpO2 92%   Breastfeeding No   BMI 25.81 kg/m²       Physical Exam:  General: Alert, cooperative, no distress. Head:  Normocephalic, without obvious abnormality, atraumatic. Eyes:  Conjunctivae/corneas clear. Pupils equal, round, reactive to light. Extraocular movements intact. Lungs:  Clear to auscultation bilaterally, no wheezes, crackles  Chest wall: No tenderness or deformity. Heart:  Regular rate and rhythm, S1, S2 normal, no murmur, click, rub, or gallop. Abdomen:   Soft, non-tender. Bowel sounds normal. No masses. No organomegaly. Back:  No spine tenderness to palpation  Extremities: Extremities normal, atraumatic, no cyanosis or edema. Pulses: Symmetric all extremities. Skin: Skin color, texture, turgor normal.   Lymph nodes: Cervical nodes normal.  Neurologic: CNII-XII intact. Normal strength, sensation, and reflexes throughout.       Labs:  Recent Results (from the past 24 hour(s))   EKG, 12 LEAD, INITIAL    Collection Time: 12/07/22  9:55 AM   Result Value Ref Range    Ventricular Rate 81 BPM    Atrial Rate 81 BPM    P-R Interval 143 ms    QRS Duration 115 ms    Q-T Interval 419 ms    QTC Calculation (Bezet) 487 ms    Calculated P Axis -10 degrees    Calculated R Axis -51 degrees    Calculated T Axis 43 degrees    Diagnosis       Sinus rhythm  Probable left atrial enlargement  Incomplete RBBB and LAFB  Left ventricular hypertrophy     CBC WITH AUTOMATED DIFF    Collection Time: 12/07/22 10:00 AM   Result Value Ref Range    WBC 14.7 (H) 3.6 - 11.0 K/uL    RBC 4.10 3.80 - 5.20 M/uL    HGB 11.1 (L) 11.5 - 16.0 g/dL    HCT 34.4 (L) 35.0 - 47.0 %    MCV 83.9 80.0 - 99.0 FL    MCH 27.1 26.0 - 34.0 PG    MCHC 32.3 30.0 - 36.5 g/dL    RDW 13.8 11.5 - 14.5 %    PLATELET 277 (H) 204 - 400 K/uL    MPV 8.5 (L) 8.9 - 12.9 FL    NRBC 0.0 0.0  WBC    ABSOLUTE NRBC 0.00 0.00 - 0.01 K/uL    NEUTROPHILS 74 32 - 75 %    LYMPHOCYTES 17 12 - 49 %    MONOCYTES 8 5 - 13 %    EOSINOPHILS 0 0 - 7 %    BASOPHILS 0 0 - 1 %    IMMATURE GRANULOCYTES 1 (H) 0 - 0.5 %    ABS. NEUTROPHILS 10.9 (H) 1.8 - 8.0 K/UL    ABS. LYMPHOCYTES 2.5 0.8 - 3.5 K/UL    ABS. MONOCYTES 1.2 (H) 0.0 - 1.0 K/UL    ABS. EOSINOPHILS 0.0 0.0 - 0.4 K/UL    ABS. BASOPHILS 0.0 0.0 - 0.1 K/UL    ABS. IMM. GRANS. 0.1 (H) 0.00 - 0.04 K/UL    DF AUTOMATED     METABOLIC PANEL, COMPREHENSIVE    Collection Time: 12/07/22 10:00 AM   Result Value Ref Range    Sodium 131 (L) 136 - 145 mmol/L    Potassium 2.9 (L) 3.5 - 5.1 mmol/L    Chloride 92 (L) 97 - 108 mmol/L    CO2 33 (H) 21 - 32 mmol/L    Anion gap 6 5 - 15 mmol/L    Glucose 186 (H) 65 - 100 mg/dL    BUN 11 6 - 20 mg/dL    Creatinine 0.78 0.55 - 1.02 mg/dL    BUN/Creatinine ratio 14 12 - 20      eGFR >60 >60 ml/min/1.73m2    Calcium 8.8 8.5 - 10.1 mg/dL    Bilirubin, total 0.5 0.2 - 1.0 mg/dL    AST (SGOT) 12 (L) 15 - 37 U/L    ALT (SGPT) 12 12 - 78 U/L    Alk.  phosphatase 136 (H) 45 - 117 U/L    Protein, total 7.3 6.4 - 8.2 g/dL    Albumin 2.2 (L) 3.5 - 5.0 g/dL    Globulin 5.1 (H) 2.0 - 4.0 g/dL    A-G Ratio 0.4 (L) 1.1 - 2.2     MAGNESIUM    Collection Time: 12/07/22 10:00 AM Result Value Ref Range    Magnesium 1.1 (L) 1.6 - 2.4 mg/dL   TROPONIN-HIGH SENSITIVITY    Collection Time: 12/07/22 10:00 AM   Result Value Ref Range    Troponin-High Sensitivity 10 0 - 51 ng/L   LACTIC ACID    Collection Time: 12/07/22 10:00 AM   Result Value Ref Range    Lactic acid 1.4 0.4 - 2.0 mmol/L   NT-PRO BNP    Collection Time: 12/07/22 10:00 AM   Result Value Ref Range    NT pro- (H) <125 pg/mL   URINALYSIS W/ REFLEX CULTURE    Collection Time: 12/07/22  1:27 PM    Specimen: Urine   Result Value Ref Range    Color Yellow/Straw      Appearance Clear Clear      Specific gravity 1.010 1.003 - 1.030      pH (UA) 7.0 5.0 - 8.0      Protein 30 (A) Negative mg/dL    Glucose 250 (A) Negative mg/dL    Ketone Negative Negative mg/dL    Bilirubin Negative Negative      Blood Negative Negative      Urobilinogen 1.0 0.2 - 1.0 EU/dL    Nitrites Negative Negative      Leukocyte Esterase Moderate (A) Negative      WBC 10-20 0 - 5 /hpf    RBC 0-5 0 - 3 /hpf    Bacteria 2+ (A) Negative /hpf    UA:UC IF INDICATED Urine Culture Ordered (A) Culture not indicated by UA result     INFLUENZA A & B AG (RAPID TEST)    Collection Time: 12/07/22  2:15 PM   Result Value Ref Range    Influenza A Antigen Negative Negative      Influenza B Antigen Negative Negative     RSV AG - RAPID    Collection Time: 12/07/22  2:15 PM   Result Value Ref Range    RSV Antigen Positive (A) Negative     GLUCOSE, POC    Collection Time: 12/07/22  3:32 PM   Result Value Ref Range    Glucose (POC) 211 (H) 65 - 100 mg/dL    Performed by Kyler SANTANAID-19 WITH INFLUENZA A/B    Collection Time: 12/07/22  4:00 PM   Result Value Ref Range    SARS-CoV-2 by PCR Not Detected Not Detected      Influenza A by PCR Not Detected Not Detected      Influenza B by PCR Not Detected Not Detected         Imaging:  XR CHEST PORT    Result Date: 12/7/2022  No acute process on portable chest.       Assessment & Plan:     UTI  -Most likely cause of patient's poor p.o. intake and continued fatigue since her flu a diagnosis in middle November  -Urinalysis positive with 2+ bacteria and moderate leukocytes  -Follow urine culture  -Has penicillin allergy so we will start Levaquin 750 mg IV daily    Dehydration  -Secondary poor p.o. intake since her flu a diagnosis  -Continue current IV fluids after 500 cc normal saline bolus given the emergency room continue normal saline with 20 mill equivalent KCl at 125 an hour  -Labs in a.m. Hypokalemia  -Noted to be 2.9 and was given 40 mill equivalent KCl oral x1 in the emergency room and will continue 20 mill equivalent KCl and IV fluids  -Follow repeat labs in a.m. Hypomagnesemia  -Was 1.1 and given 1 g IV mag sulfate rider in the ED and will give additional 1 g dosing and started on magnesium oxide 400 mg oral twice daily  -Follow repeat mag level in a.m.     Generalized weakness  -Secondary to dehydration and poor p.o. intake and recent infection along with now noting UTI  -Follow PT OT evaluation and recommendation    Hypoalbuminemia  -Only 2.2 and will obtain nutrition consultation for supplements    Diabetes  -Obtain A1c  -States she is allergic to regular insulin so we will do sliding scale and monitor closely  -Restart metformin but hold glipizide     Hypertension  -Restart about succinate 25 mg daily    Hyperlipidemia  -Continue current statin    GERD  -Having increasing GERD symptoms and has epigastric pain on palpation  -Continue home pantoprazole 40 mg daily as well as Mylanta with meals    DVT prophylaxis  -Lovenox    CODE STATUS: Full code  Patient designates her  as her medical decision-maker if for some reason she is unable to make decisions for herself    50 minutes evaluating cording patient's admission to remote telemetry and expect at least 1 to 2 days of acute care setting         Electronically signed by Michael Evans MD on 12/7/2022 at 5:50 PM

## 2022-12-07 NOTE — PROGRESS NOTES
Problem: Risk for Spread of Infection  Goal: Prevent transmission of infectious organism to others  Description: Prevent the transmission of infectious organisms to other patients, staff members, and visitors. Outcome: Progressing Towards Goal     Problem: Pressure Injury - Risk of  Goal: *Prevention of pressure injury  Description: Document Ganesh Scale and appropriate interventions in the flowsheet.   Outcome: Progressing Towards Goal  Note: Pressure Injury Interventions:  Sensory Interventions: Avoid rigorous massage over bony prominences         Activity Interventions: PT/OT evaluation    Mobility Interventions: HOB 30 degrees or less    Nutrition Interventions: Document food/fluid/supplement intake    Friction and Shear Interventions: Apply protective barrier, creams and emollients

## 2022-12-07 NOTE — ED TRIAGE NOTES
Patient diagnosed with the flu on 11/17 and has progressively gotten worse patient reports cough that makes her chest hurt, gradual weakness and inability to eat and consume liquids.

## 2022-12-08 LAB
ANION GAP SERPL CALC-SCNC: 5 MMOL/L (ref 5–15)
ATRIAL RATE: 81 BPM
BACTERIA SPEC CULT: NORMAL
BASOPHILS # BLD: 0.1 K/UL (ref 0–0.1)
BASOPHILS NFR BLD: 1 % (ref 0–1)
BUN SERPL-MCNC: 5 MG/DL (ref 6–20)
BUN/CREAT SERPL: 7 (ref 12–20)
CA-I BLD-MCNC: 8.5 MG/DL (ref 8.5–10.1)
CALCULATED P AXIS, ECG09: -10 DEGREES
CALCULATED R AXIS, ECG10: -51 DEGREES
CALCULATED T AXIS, ECG11: 43 DEGREES
CHLORIDE SERPL-SCNC: 97 MMOL/L (ref 97–108)
CO2 SERPL-SCNC: 32 MMOL/L (ref 21–32)
COLONY COUNT,CNT: NORMAL
COLONY COUNT,CNT: NORMAL
CREAT SERPL-MCNC: 0.69 MG/DL (ref 0.55–1.02)
DIAGNOSIS, 93000: NORMAL
DIFFERENTIAL METHOD BLD: ABNORMAL
EOSINOPHIL # BLD: 0 K/UL (ref 0–0.4)
EOSINOPHIL NFR BLD: 0 % (ref 0–7)
ERYTHROCYTE [DISTWIDTH] IN BLOOD BY AUTOMATED COUNT: 14 % (ref 11.5–14.5)
GLUCOSE BLD STRIP.AUTO-MCNC: 257 MG/DL (ref 65–100)
GLUCOSE SERPL-MCNC: 135 MG/DL (ref 65–100)
HCT VFR BLD AUTO: 36.8 % (ref 35–47)
HGB BLD-MCNC: 11.6 G/DL (ref 11.5–16)
IMM GRANULOCYTES # BLD AUTO: 0.1 K/UL (ref 0–0.04)
IMM GRANULOCYTES NFR BLD AUTO: 0 % (ref 0–0.5)
LYMPHOCYTES # BLD: 2 K/UL (ref 0.8–3.5)
LYMPHOCYTES NFR BLD: 14 % (ref 12–49)
MAGNESIUM SERPL-MCNC: 1.5 MG/DL (ref 1.6–2.4)
MCH RBC QN AUTO: 27 PG (ref 26–34)
MCHC RBC AUTO-ENTMCNC: 31.5 G/DL (ref 30–36.5)
MCV RBC AUTO: 85.6 FL (ref 80–99)
MONOCYTES # BLD: 1.1 K/UL (ref 0–1)
MONOCYTES NFR BLD: 7 % (ref 5–13)
NEUTS SEG # BLD: 11.4 K/UL (ref 1.8–8)
NEUTS SEG NFR BLD: 78 % (ref 32–75)
NRBC # BLD: 0 K/UL (ref 0–0.01)
NRBC BLD-RTO: 0 PER 100 WBC
P-R INTERVAL, ECG05: 143 MS
PERFORMED BY, TECHID: ABNORMAL
PLATELET # BLD AUTO: 589 K/UL (ref 150–400)
PMV BLD AUTO: 8.6 FL (ref 8.9–12.9)
POTASSIUM SERPL-SCNC: 4 MMOL/L (ref 3.5–5.1)
Q-T INTERVAL, ECG07: 419 MS
QRS DURATION, ECG06: 115 MS
QTC CALCULATION (BEZET), ECG08: 487 MS
RBC # BLD AUTO: 4.3 M/UL (ref 3.8–5.2)
SODIUM SERPL-SCNC: 134 MMOL/L (ref 136–145)
SPECIAL REQUESTS,SREQ: NORMAL
VENTRICULAR RATE, ECG03: 81 BPM
WBC # BLD AUTO: 14.6 K/UL (ref 3.6–11)

## 2022-12-08 PROCEDURE — 74011000250 HC RX REV CODE- 250: Performed by: HOSPITALIST

## 2022-12-08 PROCEDURE — 36415 COLL VENOUS BLD VENIPUNCTURE: CPT

## 2022-12-08 PROCEDURE — 74011636637 HC RX REV CODE- 636/637: Performed by: INTERNAL MEDICINE

## 2022-12-08 PROCEDURE — 97116 GAIT TRAINING THERAPY: CPT

## 2022-12-08 PROCEDURE — 85025 COMPLETE CBC W/AUTO DIFF WBC: CPT

## 2022-12-08 PROCEDURE — 97161 PT EVAL LOW COMPLEX 20 MIN: CPT

## 2022-12-08 PROCEDURE — 65270000029 HC RM PRIVATE

## 2022-12-08 PROCEDURE — 74011250637 HC RX REV CODE- 250/637: Performed by: INTERNAL MEDICINE

## 2022-12-08 PROCEDURE — 74011250637 HC RX REV CODE- 250/637: Performed by: HOSPITALIST

## 2022-12-08 PROCEDURE — 80048 BASIC METABOLIC PNL TOTAL CA: CPT

## 2022-12-08 PROCEDURE — 74011250636 HC RX REV CODE- 250/636: Performed by: HOSPITALIST

## 2022-12-08 PROCEDURE — 82962 GLUCOSE BLOOD TEST: CPT

## 2022-12-08 PROCEDURE — 83735 ASSAY OF MAGNESIUM: CPT

## 2022-12-08 PROCEDURE — 87150 DNA/RNA AMPLIFIED PROBE: CPT

## 2022-12-08 RX ORDER — INSULIN GLARGINE 100 [IU]/ML
15 INJECTION, SOLUTION SUBCUTANEOUS
Status: DISCONTINUED | OUTPATIENT
Start: 2022-12-08 | End: 2022-12-09 | Stop reason: HOSPADM

## 2022-12-08 RX ORDER — CALCIUM CARB/MAGNESIUM CARB 311-232MG
10 TABLET ORAL
Status: DISCONTINUED | OUTPATIENT
Start: 2022-12-08 | End: 2022-12-09 | Stop reason: HOSPADM

## 2022-12-08 RX ORDER — INSULIN GLARGINE 100 [IU]/ML
30 INJECTION, SOLUTION SUBCUTANEOUS
Status: DISCONTINUED | OUTPATIENT
Start: 2022-12-08 | End: 2022-12-08

## 2022-12-08 RX ADMIN — POTASSIUM CHLORIDE AND SODIUM CHLORIDE: 900; 150 INJECTION, SOLUTION INTRAVENOUS at 12:54

## 2022-12-08 RX ADMIN — SODIUM CHLORIDE, PRESERVATIVE FREE 10 ML: 5 INJECTION INTRAVENOUS at 06:08

## 2022-12-08 RX ADMIN — CETIRIZINE HYDROCHLORIDE 10 MG: 10 TABLET, FILM COATED ORAL at 17:22

## 2022-12-08 RX ADMIN — MAGNESIUM OXIDE 400 MG: 400 TABLET ORAL at 09:34

## 2022-12-08 RX ADMIN — LEVOFLOXACIN 750 MG: 750 INJECTION, SOLUTION INTRAVENOUS at 16:35

## 2022-12-08 RX ADMIN — ALUMINUM HYDROXIDE, MAGNESIUM HYDROXIDE, AND SIMETHICONE 30 ML: 200; 200; 20 SUSPENSION ORAL at 16:34

## 2022-12-08 RX ADMIN — PANTOPRAZOLE SODIUM 40 MG: 40 TABLET, DELAYED RELEASE ORAL at 07:36

## 2022-12-08 RX ADMIN — Medication 1000 UNITS: at 09:34

## 2022-12-08 RX ADMIN — ALUMINUM HYDROXIDE, MAGNESIUM HYDROXIDE, AND SIMETHICONE 30 ML: 200; 200; 20 SUSPENSION ORAL at 07:36

## 2022-12-08 RX ADMIN — MAGNESIUM OXIDE 400 MG: 400 TABLET ORAL at 21:11

## 2022-12-08 RX ADMIN — Medication 10 MG: at 21:11

## 2022-12-08 RX ADMIN — ENOXAPARIN SODIUM 40 MG: 100 INJECTION SUBCUTANEOUS at 09:34

## 2022-12-08 RX ADMIN — POTASSIUM CHLORIDE AND SODIUM CHLORIDE: 900; 150 INJECTION, SOLUTION INTRAVENOUS at 21:26

## 2022-12-08 RX ADMIN — METOPROLOL SUCCINATE 25 MG: 25 TABLET, EXTENDED RELEASE ORAL at 09:34

## 2022-12-08 RX ADMIN — METFORMIN HYDROCHLORIDE 1000 MG: 500 TABLET ORAL at 17:22

## 2022-12-08 RX ADMIN — ATORVASTATIN CALCIUM 10 MG: 10 TABLET, FILM COATED ORAL at 09:34

## 2022-12-08 RX ADMIN — ALUMINUM HYDROXIDE, MAGNESIUM HYDROXIDE, AND SIMETHICONE 30 ML: 200; 200; 20 SUSPENSION ORAL at 11:56

## 2022-12-08 RX ADMIN — SODIUM CHLORIDE, PRESERVATIVE FREE 10 ML: 5 INJECTION INTRAVENOUS at 21:12

## 2022-12-08 RX ADMIN — SODIUM CHLORIDE, PRESERVATIVE FREE 10 ML: 5 INJECTION INTRAVENOUS at 13:31

## 2022-12-08 RX ADMIN — METFORMIN HYDROCHLORIDE 1000 MG: 500 TABLET ORAL at 07:36

## 2022-12-08 RX ADMIN — FLUTICASONE PROPIONATE 2 SPRAY: 50 SPRAY, METERED NASAL at 09:34

## 2022-12-08 NOTE — ROUTINE PROCESS
Bedside shift change report given to hector barr (oncoming nurse) by Tiera Ragsdale r.n. (offgoing nurse). Report included the following information Kardex.

## 2022-12-08 NOTE — PROGRESS NOTES
HOSPITALIST PROGRESS NOTE  Yusef Guadalupe MD, [address]         Daily Progress Note: 12/8/2022  RSV positive  Appropriate PPE donned and doffed. Contact precautions. Subjective:     Patient is alert and oriented x4 and laying in bed with continued cough and shortness of breath.  at bedside. No overnight fever/chills, chest pain, nausea vomiting or abdominal pain noted. Needs to have significant weakness and cough. However, tolerated PT quite well and able to ambulate to the bedside door and back with physical therapy. Counseled on being treated for possible superimposed pneumonia status post history of influenza which was not improving at home along with superimposed RSV infection. Likely given discharged home in a.m. if continues to improve.       Medications reviewed  Current Facility-Administered Medications   Medication Dose Route Frequency    melatonin (rapid dissolve) tablet 10 mg  10 mg Oral QHS    benzonatate (TESSALON) capsule 100 mg  100 mg Oral TID PRN    sodium chloride (NS) flush 5-40 mL  5-40 mL IntraVENous Q8H    sodium chloride (NS) flush 5-40 mL  5-40 mL IntraVENous PRN    acetaminophen (TYLENOL) tablet 650 mg  650 mg Oral Q6H PRN    Or    acetaminophen (TYLENOL) suppository 650 mg  650 mg Rectal Q6H PRN    polyethylene glycol (MIRALAX) packet 17 g  17 g Oral DAILY PRN    enoxaparin (LOVENOX) injection 40 mg  40 mg SubCUTAneous DAILY    ondansetron (ZOFRAN) injection 4 mg  4 mg IntraVENous Q6H PRN    fluticasone propionate (FLONASE) 50 mcg/actuation nasal spray 2 Spray  2 Spray Both Nostrils DAILY    cetirizine (ZYRTEC) tablet 10 mg  10 mg Oral QPM    glucose chewable tablet 16 g  4 Tablet Oral PRN    dextrose (D50W) injection syrg 12.5-25 g  25-50 mL IntraVENous PRN    glucagon (GLUCAGEN) injection 1 mg  1 mg IntraMUSCular PRN    0.9% sodium chloride with KCl 20 mEq/L infusion   IntraVENous CONTINUOUS    cholecalciferol (VITAMIN D3) (1000 Units /25 mcg) tablet 1,000 Units  1,000 Units Oral DAILY    atorvastatin (LIPITOR) tablet 10 mg  10 mg Oral DAILY    metFORMIN (GLUCOPHAGE) tablet 1,000 mg  1,000 mg Oral BID WITH MEALS    metoprolol succinate (TOPROL-XL) XL tablet 25 mg  25 mg Oral DAILY    pantoprazole (PROTONIX) tablet 40 mg  40 mg Oral ACB    levoFLOXacin (LEVAQUIN) 750 mg in D5W IVPB  750 mg IntraVENous Q24H    alum-mag hydroxide-simeth (MYLANTA) oral suspension 30 mL  30 mL Oral TID WITH MEALS    magnesium oxide (MAG-OX) tablet 400 mg  400 mg Oral BID       Review of Systems:   A comprehensive review of systems was negative except for that written in the HPI. Objective:   Physical Exam:     Visit Vitals  BP (!) 149/80   Pulse (!) 103   Temp 97.9 °F (36.6 °C)   Resp 18   Ht 5' 3\" (1.6 m)   Wt 66.1 kg (145 lb 11.2 oz)   SpO2 93%   Breastfeeding No   BMI 25.81 kg/m²    O2 Flow Rate (L/min): 1 l/min O2 Device: None (Room air)  Patient Vitals for the past 8 hrs:   Temp Pulse Resp BP SpO2   22 1200 -- (!) 103 -- -- --   22 1153 97.9 °F (36.6 °C) 83 18 (!) 149/80 93 %          Temp (24hrs), Av.1 °F (36.7 °C), Min:97.5 °F (36.4 °C), Max:98.5 °F (36.9 °C)    701 - 1900  In: 524 [P.O.:480; I.V.:114]  Out: 400 [Urine:400]   1901 -  0700  In: 846.3 [P.O.:240; I.V.:606.3]  Out: 1200 [Urine:1200]    General:  Alert, cooperative, no distress, appears stated age. Lungs:   Clear to auscultation bilaterally. Chest wall:  No tenderness or deformity. Heart:  Regular rate and rhythm, S1, S2 normal, no murmur, click, rub or gallop. Abdomen:   Soft, non-tender. Bowel sounds normal. No masses,  No organomegaly. Extremities: Extremities normal, atraumatic, no cyanosis or edema. Pulses: 2+ and symmetric all extremities. Skin: Skin color, texture, turgor normal. No rashes or lesions   Neurologic: CNII-XII intact.  No gross sensory or motor deficits     Data Review:       Recent Days:  Recent Labs     12/08/22  0532 12/07/22  1000   WBC 14.6* 14.7*   HGB 11.6 11.1*   HCT 36.8 34.4*   * 590*     Recent Labs     12/08/22  0532 12/07/22  1000   * 131*   K 4.0 2.9*   CL 97 92*   CO2 32 33*   * 186*   BUN 5* 11   CREA 0.69 0.78   CA 8.5 8.8   MG 1.5* 1.1*   ALB  --  2.2*   TBILI  --  0.5   ALT  --  12     No results for input(s): PH, PCO2, PO2, HCO3, FIO2 in the last 72 hours. 24 Hour Results:  Recent Results (from the past 24 hour(s))   METABOLIC PANEL, BASIC    Collection Time: 12/08/22  5:32 AM   Result Value Ref Range    Sodium 134 (L) 136 - 145 mmol/L    Potassium 4.0 3.5 - 5.1 mmol/L    Chloride 97 97 - 108 mmol/L    CO2 32 21 - 32 mmol/L    Anion gap 5 5 - 15 mmol/L    Glucose 135 (H) 65 - 100 mg/dL    BUN 5 (L) 6 - 20 mg/dL    Creatinine 0.69 0.55 - 1.02 mg/dL    BUN/Creatinine ratio 7 (L) 12 - 20      eGFR >60 >60 ml/min/1.73m2    Calcium 8.5 8.5 - 10.1 mg/dL   MAGNESIUM    Collection Time: 12/08/22  5:32 AM   Result Value Ref Range    Magnesium 1.5 (L) 1.6 - 2.4 mg/dL   CBC WITH AUTOMATED DIFF    Collection Time: 12/08/22  5:32 AM   Result Value Ref Range    WBC 14.6 (H) 3.6 - 11.0 K/uL    RBC 4.30 3.80 - 5.20 M/uL    HGB 11.6 11.5 - 16.0 g/dL    HCT 36.8 35.0 - 47.0 %    MCV 85.6 80.0 - 99.0 FL    MCH 27.0 26.0 - 34.0 PG    MCHC 31.5 30.0 - 36.5 g/dL    RDW 14.0 11.5 - 14.5 %    PLATELET 084 (H) 859 - 400 K/uL    MPV 8.6 (L) 8.9 - 12.9 FL    NRBC 0.0 0.0  WBC    ABSOLUTE NRBC 0.00 0.00 - 0.01 K/uL    NEUTROPHILS 78 (H) 32 - 75 %    LYMPHOCYTES 14 12 - 49 %    MONOCYTES 7 5 - 13 %    EOSINOPHILS 0 0 - 7 %    BASOPHILS 1 0 - 1 %    IMMATURE GRANULOCYTES 0 0 - 0.5 %    ABS. NEUTROPHILS 11.4 (H) 1.8 - 8.0 K/UL    ABS. LYMPHOCYTES 2.0 0.8 - 3.5 K/UL    ABS. MONOCYTES 1.1 (H) 0.0 - 1.0 K/UL    ABS. EOSINOPHILS 0.0 0.0 - 0.4 K/UL    ABS. BASOPHILS 0.1 0.0 - 0.1 K/UL    ABS. IMM.  GRANS. 0.1 (H) 0.00 - 0.04 K/UL    DF AUTOMATED             Assessment/Plan:     UTI  Urinalysis positive with 2+ bacteria and moderate leukocytes  Urine culture with mixed linnea thus far. Cont' Levaquin 750 mg IV daily     Acute bronchitis/RSV bronchitis  Continues to have significant productive cough superimposed with RSV infection. Continue supportive care with antitussives as well as IV Levaquin. Patient is status post influenza infection just 1 week prior. Hypokalemia  Improved significantly with IV and p.o. repletion. Hypomagnesemia  Improved with IV and p.o. repletion. Continue Mag-Ox 400 Mg twice daily. Generalized weakness  Continues to have weakness however able to ambulate with physical therapy quite well. Likely will require home health with PT. Type 2 diabetes  A1c 10.7 noted. States she is allergic to regular insulin so we will do sliding scale and monitor closely  Patient will need to be started on Lantus 15 units nightly at least and continuing metformin and glipizide. Hypertension  Continue metoprolol succinate 25 mg daily     Hyperlipidemia  Continue statin     GERD  Continue home pantoprazole 40 mg daily as well as Mylanta with meals     DVT prophylaxis  Lovenox     CODE STATUS: Full code    Care Plan discussed with: Patient/Family, Nurse, and     Total time spent with patient: 35 minutes. With greater than 50% spent in coordination of care and counseling.     Tiki Rodriguez MD

## 2022-12-08 NOTE — PROGRESS NOTES
PHYSICAL THERAPY EVALUATION  Patient: Reyna Melendez (89 y.o. female)  Date: 12/8/2022  Primary Diagnosis: Leucocytosis [D72.829]  General weakness [R53.1]  Hypokalemia [E87.6]  Hypomagnesemia [E83.42]  Influenza A [J10.1]  Hypoalbuminemia [E88.09]       Precautions: none       ASSESSMENT  Based on the objective data described below, the patient presents with generalized weakness throughout. Patient was able to perform all bed mobility and transfers with modified independence. She ambulated using a quad cane with contact guard assist from PT. Stairs were not performed today due to patient reported fatigue. Current Level of Function Impacting Discharge (mobility/balance): Patient was unable to perform stairs at this time.  is able to help with ADLs, but reports difficulty with assisting patient up the stairs. Other factors to consider for discharge: Patient lives with her . Patient will benefit from skilled therapy intervention to address the above noted impairments. PLAN :  Recommendations and Planned Interventions: bed mobility training, transfer training, gait training, therapeutic exercises, neuromuscular re-education, patient and family training/education, and therapeutic activities      Frequency/Duration: Patient will be followed by physical therapy:  1-2 times daily, up to 5 days a week, to address goals. Recommendation for discharge: (in order for the patient to meet his/her long term goals)  Physical therapy at least 2 days/week in the home     This discharge recommendation:  Has been made in collaboration with the attending provider and/or case management    IF patient discharges home will need the following DME: patient owns DME required for discharge         SUBJECTIVE:   Patient stated I didn't sleep well last night.     OBJECTIVE DATA SUMMARY:   HISTORY:    Past Medical History:   Diagnosis Date    Burning reflux 10/14/2022    [facility]    Former smoker 10/14/2022    Using OTC Nicorette.  Hiatal hernia 10/14/2022    Nausea 10/14/2022   No past surgical history on file. Personal factors and/or comorbidities impacting plan of care:     Home Situation  Home Environment: Private residence  # Steps to Enter: 8  Rails to Enter: Yes  Hand Rails : Right  One/Two Story Residence: One story  Living Alone: No  Support Systems: Spouse/Significant Other  Patient Expects to be Discharged to[de-identified] Home  Current DME Used/Available at Home: Cane, quad    EXAMINATION/PRESENTATION/DECISION MAKING:   Critical Behavior:  Neurologic State: Alert  Orientation Level: Oriented X4        Hearing: Auditory  Auditory Impairment: None  Range Of Motion:  AROM: Generally decreased, functional    Strength:    Strength: Generally decreased, functional    Functional Mobility:  Bed Mobility:  Rolling: Modified independent  Supine to Sit: Modified independent  Sit to Supine: Modified independent     Transfers:  Sit to Stand: Modified independent  Stand to Sit: Modified independent    Balance:   Sitting: Intact  Standing: Intact; With support  Ambulation/Gait Training:  Distance (ft): 30 Feet (ft)  Assistive Device: Cane, quad  Ambulation - Level of Assistance: Contact guard assistance     Gait Description (WDL): Exceptions to WDL     Right Side Weight Bearing: Full  Left Side Weight Bearing: Full  Base of Support: Widened     Speed/Christine: Slow    Treatment Session:   Patient was lying supine in bed at beginning of session. She performed supine to sit and sit to stand with modified independence. She ambulated ~30 feet using a quad cane with contact guard assistance. She returned to supine in bed with modified independence.     Functional Measure:     Physical Therapy Evaluation Charge Determination   History Examination Presentation Decision-Making   LOW Complexity : Zero comorbidities / personal factors that will impact the outcome / POC LOW Complexity : 1-2 Standardized tests and measures addressing body structure, function, activity limitation and / or participation in recreation  LOW Complexity : Stable, uncomplicated  LOW      Based on the above components, the patient evaluation is determined to be of the following complexity level: LOW     Pain Ratin/10    Activity Tolerance:   Good    After treatment patient left in no apparent distress:   Supine in bed and Call bell within reach    COMMUNICATION/EDUCATION:   The patients plan of care was discussed with: Physical therapist, Physician, and Case management. Patient/family have participated as able in goal setting and plan of care. and Patient/family agree to work toward stated goals and plan of care. Thank you for this referral.  Jayme Valdez, PT, DPT   Time Calculation: 20 mins       Problem: Mobility Impaired (Adult and Pediatric)  Goal: *Acute Goals and Plan of Care (Insert Text)  Outcome: Not Met  Note: FUNCTIONAL STATUS PRIOR TO ADMISSION: Patient was modified independent using a WB quad cane for functional mobility. HOME SUPPORT PRIOR TO ADMISSION: The patient lived with  but did not require assist.    Physical Therapy Goals  Initiated 2022  1. Patient will ambulate with modified independence for 50 feet with the least restrictive device within 7 day(s). 2.  Patient will ascend/descend 8 stairs with right handrail(s) with modified independence within 7 day(s).

## 2022-12-08 NOTE — PROGRESS NOTES
Patients case reviewed during interdisciplinary team meeting in [address] [place] [address]/Acute Care Unit. Rev.  Georges Arnold 48, [address]

## 2022-12-08 NOTE — PROGRESS NOTES
Problem: Falls - Risk of  Goal: *Absence of Falls  Description: Document Darlen Fairview Fall Risk and appropriate interventions in the flowsheet.   Outcome: Progressing Towards Goal  Note: Fall Risk Interventions:  Mobility Interventions: PT Consult for mobility concerns         Medication Interventions: Patient to call before getting OOB, Teach patient to arise slowly    Elimination Interventions: Call light in reach, Patient to call for help with toileting needs

## 2022-12-09 VITALS
HEIGHT: 63 IN | RESPIRATION RATE: 18 BRPM | WEIGHT: 148.2 LBS | BODY MASS INDEX: 26.26 KG/M2 | HEART RATE: 94 BPM | DIASTOLIC BLOOD PRESSURE: 70 MMHG | TEMPERATURE: 97.5 F | OXYGEN SATURATION: 90 % | SYSTOLIC BLOOD PRESSURE: 145 MMHG

## 2022-12-09 LAB
ACC. NO. FROM MICRO ORDER, ACCP: ABNORMAL
ACINETOBACTER CALCOACETICUS-BAUMANII COMPLEX, ACBCX: NOT DETECTED
ANION GAP SERPL CALC-SCNC: 5 MMOL/L (ref 5–15)
BACTERIA SPEC CULT: ABNORMAL
BACTERIA SPEC CULT: ABNORMAL
BACTEROIDES FRAGILIS, BFRA: NOT DETECTED
BASOPHILS # BLD: 0.1 K/UL (ref 0–0.1)
BASOPHILS NFR BLD: 1 % (ref 0–1)
BIOFIRE COMMENT, BCIDPF: ABNORMAL
BUN SERPL-MCNC: 6 MG/DL (ref 6–20)
BUN/CREAT SERPL: 9 (ref 12–20)
C GLABRATA DNA VAG QL NAA+PROBE: NOT DETECTED
CA-I BLD-MCNC: 8.3 MG/DL (ref 8.5–10.1)
CANDIDA ALBICANS: NOT DETECTED
CANDIDA AURIS, CAAU: NOT DETECTED
CANDIDA KRUSEI, CKRP: NOT DETECTED
CANDIDA PARAPSILOSIS, CPAUP: NOT DETECTED
CANDIDA TROPICALIS, CTROP: NOT DETECTED
CHLORIDE SERPL-SCNC: 101 MMOL/L (ref 97–108)
CO2 SERPL-SCNC: 29 MMOL/L (ref 21–32)
CREAT SERPL-MCNC: 0.64 MG/DL (ref 0.55–1.02)
CRYPTO NEOFORMANS/GATTII, CRYNEG: NOT DETECTED
DIFFERENTIAL METHOD BLD: ABNORMAL
ENTEROBACTER CLOACAE COMPLEX, ECCP: NOT DETECTED
ENTEROBACTERALES SP. , ENBLS: NOT DETECTED
ENTEROCOCCUS FAECALIS, ENFA: NOT DETECTED
ENTEROCOCCUS FAECIUM, ENFAM: NOT DETECTED
EOSINOPHIL # BLD: 0.2 K/UL (ref 0–0.4)
EOSINOPHIL NFR BLD: 2 % (ref 0–7)
ERYTHROCYTE [DISTWIDTH] IN BLOOD BY AUTOMATED COUNT: 14 % (ref 11.5–14.5)
ESCHERICHIA COLI: NOT DETECTED
GLUCOSE SERPL-MCNC: 185 MG/DL (ref 65–100)
HAEMOPHILUS INFLUENZAE, HMI: NOT DETECTED
HCT VFR BLD AUTO: 34.2 % (ref 35–47)
HGB BLD-MCNC: 10.7 G/DL (ref 11.5–16)
IMM GRANULOCYTES # BLD AUTO: 0 K/UL (ref 0–0.04)
IMM GRANULOCYTES NFR BLD AUTO: 0 % (ref 0–0.5)
KLEBSIELLA AEROGENES, KLAE: NOT DETECTED
KLEBSIELLA OXYTOCA: NOT DETECTED
KLEBSIELLA PNEUMONIAE GROUP, KPPG: NOT DETECTED
LISTERIA MONOCYTOGENES, LMONP: NOT DETECTED
LYMPHOCYTES # BLD: 2.5 K/UL (ref 0.8–3.5)
LYMPHOCYTES NFR BLD: 26 % (ref 12–49)
MCH RBC QN AUTO: 26.9 PG (ref 26–34)
MCHC RBC AUTO-ENTMCNC: 31.3 G/DL (ref 30–36.5)
MCV RBC AUTO: 85.9 FL (ref 80–99)
MECA/C (METHICILLIN RESISTANT GENE), MECACP: NOT DETECTED
MONOCYTES # BLD: 0.9 K/UL (ref 0–1)
MONOCYTES NFR BLD: 9 % (ref 5–13)
NEISSERIA MENINGITIDIS, NMNI: NOT DETECTED
NEUTS SEG # BLD: 6.3 K/UL (ref 1.8–8)
NEUTS SEG NFR BLD: 62 % (ref 32–75)
NRBC # BLD: 0 K/UL (ref 0–0.01)
NRBC BLD-RTO: 0 PER 100 WBC
PLATELET # BLD AUTO: 542 K/UL (ref 150–400)
PMV BLD AUTO: 8.7 FL (ref 8.9–12.9)
POTASSIUM SERPL-SCNC: 4.3 MMOL/L (ref 3.5–5.1)
PROTEUS, PRP: NOT DETECTED
PSEUDOMONAS AERUGINOSA: NOT DETECTED
RBC # BLD AUTO: 3.98 M/UL (ref 3.8–5.2)
RESISTANT GENE SPACE, REGENE: ABNORMAL
SALMONELLA, SALMO: NOT DETECTED
SERRATIA MARCESCENS: NOT DETECTED
SODIUM SERPL-SCNC: 135 MMOL/L (ref 136–145)
SPECIAL REQUESTS,SREQ: ABNORMAL
STAPH EPIDERMIDIS, STEP: DETECTED
STAPH LUGDUNENSIS, STALUG: NOT DETECTED
STAPHYLOCOCCUS AUREUS: NOT DETECTED
STAPHYLOCOCCUS, STAPP: DETECTED
STENO MALTOPHILIA, STMA: NOT DETECTED
STREPTOCOCCUS , STPSP: NOT DETECTED
STREPTOCOCCUS AGALACTIAE (GROUP B): NOT DETECTED
STREPTOCOCCUS PNEUMONIAE , SPNP: NOT DETECTED
STREPTOCOCCUS PYOGENES (GROUP A), SPYOP: NOT DETECTED
WBC # BLD AUTO: 9.9 K/UL (ref 3.6–11)

## 2022-12-09 PROCEDURE — 97530 THERAPEUTIC ACTIVITIES: CPT

## 2022-12-09 PROCEDURE — 74011250636 HC RX REV CODE- 250/636: Performed by: HOSPITALIST

## 2022-12-09 PROCEDURE — 74011250637 HC RX REV CODE- 250/637: Performed by: HOSPITALIST

## 2022-12-09 PROCEDURE — 85025 COMPLETE CBC W/AUTO DIFF WBC: CPT

## 2022-12-09 PROCEDURE — 97116 GAIT TRAINING THERAPY: CPT

## 2022-12-09 PROCEDURE — 80048 BASIC METABOLIC PNL TOTAL CA: CPT

## 2022-12-09 PROCEDURE — 97165 OT EVAL LOW COMPLEX 30 MIN: CPT

## 2022-12-09 PROCEDURE — 36415 COLL VENOUS BLD VENIPUNCTURE: CPT

## 2022-12-09 RX ORDER — CEFDINIR 300 MG/1
300 CAPSULE ORAL 2 TIMES DAILY
Qty: 14 CAPSULE | Refills: 0 | Status: SHIPPED | OUTPATIENT
Start: 2022-12-10 | End: 2022-12-17

## 2022-12-09 RX ORDER — INSULIN GLARGINE 100 [IU]/ML
15 INJECTION, SOLUTION SUBCUTANEOUS
Qty: 4.5 ML | Refills: 0 | Status: SHIPPED | OUTPATIENT
Start: 2022-12-09 | End: 2023-01-08

## 2022-12-09 RX ORDER — DOXYCYCLINE 100 MG/1
100 CAPSULE ORAL 2 TIMES DAILY
Qty: 14 CAPSULE | Refills: 0 | Status: SHIPPED | OUTPATIENT
Start: 2022-12-09 | End: 2022-12-16

## 2022-12-09 RX ORDER — BENZONATATE 100 MG/1
100 CAPSULE ORAL
Qty: 20 CAPSULE | Refills: 0 | Status: SHIPPED | OUTPATIENT
Start: 2022-12-09 | End: 2022-12-16

## 2022-12-09 RX ORDER — CALCIUM CARB/MAGNESIUM CARB 311-232MG
10 TABLET ORAL
Qty: 60 TABLET | Refills: 0 | Status: SHIPPED | OUTPATIENT
Start: 2022-12-09 | End: 2023-01-08

## 2022-12-09 RX ADMIN — POTASSIUM CHLORIDE AND SODIUM CHLORIDE: 900; 150 INJECTION, SOLUTION INTRAVENOUS at 06:02

## 2022-12-09 RX ADMIN — ALUMINUM HYDROXIDE, MAGNESIUM HYDROXIDE, AND SIMETHICONE 30 ML: 200; 200; 20 SUSPENSION ORAL at 12:17

## 2022-12-09 RX ADMIN — METFORMIN HYDROCHLORIDE 1000 MG: 500 TABLET ORAL at 08:58

## 2022-12-09 RX ADMIN — ACETAMINOPHEN 650 MG: 325 TABLET, FILM COATED ORAL at 02:17

## 2022-12-09 RX ADMIN — METOPROLOL SUCCINATE 25 MG: 25 TABLET, EXTENDED RELEASE ORAL at 08:58

## 2022-12-09 RX ADMIN — ENOXAPARIN SODIUM 40 MG: 100 INJECTION SUBCUTANEOUS at 08:58

## 2022-12-09 RX ADMIN — PANTOPRAZOLE SODIUM 40 MG: 40 TABLET, DELAYED RELEASE ORAL at 08:58

## 2022-12-09 RX ADMIN — ALUMINUM HYDROXIDE, MAGNESIUM HYDROXIDE, AND SIMETHICONE 30 ML: 200; 200; 20 SUSPENSION ORAL at 08:59

## 2022-12-09 RX ADMIN — Medication 1000 UNITS: at 08:58

## 2022-12-09 RX ADMIN — ATORVASTATIN CALCIUM 10 MG: 10 TABLET, FILM COATED ORAL at 08:59

## 2022-12-09 RX ADMIN — FLUTICASONE PROPIONATE 2 SPRAY: 50 SPRAY, METERED NASAL at 08:59

## 2022-12-09 RX ADMIN — MAGNESIUM OXIDE 400 MG: 400 TABLET ORAL at 08:58

## 2022-12-09 NOTE — ROUTINE PROCESS
Report was received from the offgoing nurse [facility] LPN. Care was resumed via the incoming nurse [facility] LEAH HURTADO. The report consist of using kardex information.

## 2022-12-09 NOTE — ROUTINE PROCESS
The pt have been sleeping at intervals. She have been assisted with being comfortable in bed. Pericare have been done. The purewick was changed. Lower ext are up on pillows. No increased respiratory distress have been noted.

## 2022-12-09 NOTE — PROGRESS NOTES
Discharge plan of care/case management plan validated with provider discharge order. Referral sent to and accepted by [facility], per patient/ choice. Care Management Interventions  Transition of Care Consult (CM Consult): Discharge Planning, Home Health  Physical Therapy Consult: Yes  Occupational Therapy Consult: Yes  Support Systems: Spouse/Significant Other  Confirm Follow Up Transport: Family  The Plan for Transition of Care is Related to the Following Treatment Goals : Patient lives in her own home with her . Plan to discharge home with home health services. Discharge Location  Patient Expects to be Discharged to[de-identified] Home with home health    Reason for Admission:  Flu                     RUR Score:   15- moderate risk               PCP: First and Last name:   TIGRE Lujan     Name of Practice: [facility]   Are you a current patient: Yes/No:    Approximate date of last visit: 10/24/22   Can you participate in a virtual visit if needed: no    Do you (patient/family) have any concerns for transition/discharge? No               Plan for utilizing home health: yes      Current Advanced Directive/Advance Care Plan:  Full Code      Healthcare Decision Maker:   Click here to complete [address] including selection of the Healthcare Decision Maker Relationship (ie \"Primary\")            Primary Decision Maker: Allyson Fraction - Spouse - [phone]    Transition of Care Plan:  Patient is being discharged home with her  and home health services.

## 2022-12-09 NOTE — PROGRESS NOTES
Problem: Risk for Spread of Infection  Goal: Prevent transmission of infectious organism to others  Description: Prevent the transmission of infectious organisms to other patients, staff members, and visitors. Outcome: Resolved/Met     Problem: Patient Education:  Go to Education Activity  Goal: Patient/Family Education  Outcome: Resolved/Met     Problem: Pressure Injury - Risk of  Goal: *Prevention of pressure injury  Description: Document Ganesh Scale and appropriate interventions in the flowsheet. Outcome: Resolved/Met  Note: Pressure Injury Interventions:  Sensory Interventions: Avoid rigorous massage over bony prominences    Moisture Interventions: Absorbent underpads    Activity Interventions: Increase time out of bed, PT/OT evaluation    Mobility Interventions: PT/OT evaluation    Nutrition Interventions: Document food/fluid/supplement intake    Friction and Shear Interventions: Minimize layers, Apply protective barrier, creams and emollients                Problem: Patient Education: Go to Patient Education Activity  Goal: Patient/Family Education  Outcome: Resolved/Met     Problem: Falls - Risk of  Goal: *Absence of Falls  Description: Document Jayleen Fall Risk and appropriate interventions in the flowsheet.   12/9/2022 1358 by Sheree PAULINO  Outcome: Resolved/Met  Note: Fall Risk Interventions:  Mobility Interventions: Patient to call before getting OOB, PT Consult for mobility concerns         Medication Interventions: Patient to call before getting OOB, Teach patient to arise slowly    Elimination Interventions: Call light in reach           12/9/2022 0974 by Sheree PAULINO  Outcome: Progressing Towards Goal  Note: Fall Risk Interventions:  Mobility Interventions: Patient to call before getting OOB, PT Consult for mobility concerns         Medication Interventions: Patient to call before getting OOB, Teach patient to arise slowly    Elimination Interventions: Call light in reach              Problem: Patient Education: Go to Patient Education Activity  Goal: Patient/Family Education  Outcome: Resolved/Met     Problem: Diabetes Self-Management  Goal: *Disease process and treatment process  Description: Define diabetes and identify own type of diabetes; list 3 options for treating diabetes. Outcome: Resolved/Met  Goal: *Incorporating nutritional management into lifestyle  Description: Describe effect of type, amount and timing of food on blood glucose; list 3 methods for planning meals. Outcome: Resolved/Met  Goal: *Incorporating physical activity into lifestyle  Description: State effect of exercise on blood glucose levels. Outcome: Resolved/Met  Goal: *Developing strategies to promote health/change behavior  Description: Define the ABC's of diabetes; identify appropriate screenings, schedule and personal plan for screenings. Outcome: Resolved/Met  Goal: *Using medications safely  Description: State effect of diabetes medications on diabetes; name diabetes medication taking, action and side effects. Outcome: Resolved/Met  Goal: *Monitoring blood glucose, interpreting and using results  Description: Identify recommended blood glucose targets  and personal targets. Outcome: Resolved/Met  Goal: *Prevention, detection, treatment of acute complications  Description: List symptoms of hyper- and hypoglycemia; describe how to treat low blood sugar and actions for lowering  high blood glucose level. Outcome: Resolved/Met  Goal: *Prevention, detection and treatment of chronic complications  Description: Define the natural course of diabetes and describe the relationship of blood glucose levels to long term complications of diabetes.   Outcome: Resolved/Met  Goal: *Developing strategies to address psychosocial issues  Description: Describe feelings about living with diabetes; identify support needed and support network  Outcome: Resolved/Met  Goal: *Insulin pump training  Outcome: Resolved/Met  Goal: *Sick day guidelines  Outcome: Resolved/Met  Goal: *Patient Specific Goal (EDIT GOAL, INSERT TEXT)  Outcome: Resolved/Met     Problem: Patient Education: Go to Patient Education Activity  Goal: Patient/Family Education  Outcome: Resolved/Met     Problem: Patient Education: Go to Patient Education Activity  Goal: Patient/Family Education  Outcome: Resolved/Met

## 2022-12-09 NOTE — ROUTINE PROCESS
Bedside shift change report given to hector barr (oncoming nurse) by Charo Osullivan (offgoing nurse). Report included the following information Kardex.

## 2022-12-09 NOTE — ROUTINE PROCESS
The pt have resting in bed watching tv. There have been no c/o of having any pain. Iv continue to infuse at 125cc/hr. She have shown no diabetic symptoms. The pt did refuse her insulin coverage stating that she was allergic to insulin. She states she only take a pill for her diabetes. Purewick in place. She is being asssited as needed.

## 2022-12-09 NOTE — PROGRESS NOTES
Problem: Falls - Risk of  Goal: *Absence of Falls  Description: Document Nik Meneses Fall Risk and appropriate interventions in the flowsheet.   Outcome: Progressing Towards Goal  Note: Fall Risk Interventions:  Mobility Interventions: Patient to call before getting OOB, PT Consult for mobility concerns         Medication Interventions: Patient to call before getting OOB, Teach patient to arise slowly    Elimination Interventions: Call light in reach

## 2022-12-09 NOTE — PROGRESS NOTES
PHYSICAL THERAPY TREATMENT  Patient: Ming Fernandes (80 y.o. female)  Date: 12/9/2022  Diagnosis: Leucocytosis [D72.829]  General weakness [R53.1]  Hypokalemia [E87.6]  Hypomagnesemia [E83.42]  Influenza A [J10.1]  Hypoalbuminemia [E88.09] UTI (urinary tract infection)      Precautions:  none  Chart, physical therapy assessment, plan of care and goals were reviewed. ASSESSMENT  Patient continues with skilled PT services and has met all of her goals. Patient was modified independent with all bed mobility, transfers, and ambulation. Patient ambulated ~200 feet modified independent. Patient was able to complete 9 stairs using one handrail only with contact guard assist from PT. Current Level of Function Impacting Discharge (mobility/balance): Patient lives at home with  for assistance with stairs. Other factors to consider for discharge: not applicable         PLAN :  Patient continues to benefit from skilled intervention to address the above impairments. Continue treatment per established plan of care. to address goals. Recommendation for discharge: (in order for the patient to meet his/her long term goals)  Physical therapy at least 2 days/week in the home     This discharge recommendation:  Has been made in collaboration with the attending provider and/or case management    IF patient discharges home will need the following DME: rolling walker       SUBJECTIVE:   Patient stated I'm feeling better today.     OBJECTIVE DATA SUMMARY:   Critical Behavior:  Neurologic State: Alert  Orientation Level: Oriented to place, Oriented to person        Functional Mobility Training:  Bed Mobility:  Rolling: Modified independent  Supine to Sit: Modified independent  Sit to Supine: Modified independent    Transfers:  Sit to Stand: Modified independent  Stand to Sit: Modified independent    Balance:  Sitting: Intact  Standing: Intact; With support    Ambulation/Gait Training:  Distance (ft): 200 Feet (ft)  Assistive Device: Walker, rolling  Ambulation - Level of Assistance: Modified independent           Right Side Weight Bearing: Full  Left Side Weight Bearing: Full  Base of Support: Widened     Speed/Christine: Slow    Treatment Session:   Patient required contact guard assist from PT in order to complete 9 stairs and use of 1 handrail. Patient was modified independent required use of rolling walker for longer distance ambulation today. Pain Ratin/10    Activity Tolerance:   Good    After treatment patient left in no apparent distress:   Supine in bed and Call bell within reach    COMMUNICATION/COLLABORATION:   The patients plan of care was discussed with: Physical therapist, Physician, and Case management.      Yeison Pandey, PT, DPT   Time Calculation: 25 mins         Problem: Mobility Impaired (Adult and Pediatric)  Goal: *Acute Goals and Plan of Care (Insert Text)  Outcome: Resolved/Met

## 2022-12-09 NOTE — PROGRESS NOTES
OCCUPATIONAL THERAPY EVALUATION  Patient: Espinoza Garcia (43 y.o. female)  Date: 12/9/2022  Primary Diagnosis: Leucocytosis [D72.829]  General weakness [R53.1]  Hypokalemia [E87.6]  Hypomagnesemia [E83.42]  Influenza A [J10.1]  Hypoalbuminemia [E88.09]       Precautions:  Fall    ASSESSMENT  Based on the objective data described below, the patient presents with need for occupational therapy services to address decreased safety and independence with ADL and IADL tasks and transfers. Prior to hospitalization patient reports she was completing all dressing and toileting tasks independently using SBQC. Patient reports  was assisting her with tub transfer and she was completing bathing tasks independently. Patient demonstrates need for RW to complete toilet transfer Mod I and need for assist with bathing tasks for safety and thoroughness. Current Level of Function Impacting Discharge (ADLs/self-care): patient requires assist for ADL/IADL tasks for safety and thoroughness    Other factors to consider for discharge: requires assist      Patient will benefit from skilled therapy intervention to address the above noted impairments. PLAN :  Recommendations and Planned Interventions: self care training, functional mobility training, therapeutic exercise, balance training, therapeutic activities, endurance activities, patient education, home safety training, and family training/education    Frequency/Duration: Patient will be followed by occupational therapy 3 times a week to address goals. Recommendation for discharge: (in order for the patient to meet his/her long term goals)  Occupational therapy at least 2 days/week in the home AND ensure assist and/or supervision for safety with ADL/IADL tasks and transfers.      This discharge recommendation:  Has been made in collaboration with the attending provider and/or case management    IF patient discharges home will need the following DME: patient owns DME required for discharge       SUBJECTIVE:   Patient stated I hope I go home soon.     OBJECTIVE DATA SUMMARY:   HISTORY:   Past Medical History:   Diagnosis Date    Burning reflux 10/14/2022    Diabetes ([address] 10/14/2022    Former smoker 10/14/2022    Using OTC [place]. Hiatal hernia 10/14/2022    Nausea 10/14/2022   No past surgical history on file. Expanded or extensive additional review of patient history:     Home Situation  Home Environment: Private residence  # Steps to Enter: 8  Rails to Enter: Yes  Hand Rails : Right  One/Two Story Residence: One story  Living Alone: No  Support Systems: Spouse/Significant Other  Patient Expects to be Discharged to[de-identified] Home  Current DME Used/Available at Home: Cane, quad  Tub or Shower Type: Tub/Shower combination    Hand dominance: Right    EXAMINATION OF PERFORMANCE DEFICITS:  Cognitive/Behavioral Status:  Neurologic State: Alert  Orientation Level: Oriented X4  Cognition: Appropriate decision making; Appropriate safety awareness; Follows commands        Safety/Judgement: Good awareness of safety precautions; Insight into deficits    Skin: no issues     Edema: none    Hearing: Auditory  Auditory Impairment: None    Vision/Perceptual:      Range of Motion:    AROM: Generally decreased, functional    Strength:    Strength: Generally decreased, functional      Coordination:     Fine Motor Skills-Upper: Left Intact; Right Intact    Gross Motor Skills-Upper: Left Intact; Right Intact    Tone & Sensation:  No issues     Balance:  Sitting: Intact  Standing: Intact; With support    Functional Mobility and Transfers for ADLs:  Bed Mobility:  Rolling: Modified independent  Supine to Sit: Modified independent  Sit to Supine: Modified independent    Transfers:  Sit to Stand: Modified independent  Stand to Sit: Modified independent  Toilet Transfer : Modified independent    ADL Assessment:    Toileting: Modified independent using [facility]     ADL Intervention and task modifications:    Cognitive Retraining  Safety/Judgement: Good awareness of safety precautions; Insight into deficits    Occupational Therapy Evaluation Charge Determination   History Examination Decision-Making   LOW Complexity : Brief history review  LOW Complexity : 1-3 performance deficits relating to physical, cognitive , or psychosocial skils that result in activity limitations and / or participation restrictions  LOW Complexity : No comorbidities that affect functional and no verbal or physical assistance needed to complete eval tasks       Based on the above components, the patient evaluation is determined to be of the following complexity level: LOW   Pain Ratin/10    Activity Tolerance:   Good  Please refer to the flowsheet for vital signs taken during this treatment. After treatment patient left in no apparent distress:    Supine in bed    COMMUNICATION/EDUCATION:   The patients plan of care was discussed with: Physical therapist.     Home safety education was provided and the patient/caregiver indicated understanding. and Patient/family agree to work toward stated goals and plan of care. This patients plan of care is appropriate for delegation to \[facility]\"". Thank you for this referral.  Asha Guzman, OT  Time Calculation: 12 mins   Problem: Self Care Deficits Care Plan (Adult)  Goal: *Acute Goals and Plan of Care (Insert Text)  Outcome: Not Met  Note:   FUNCTIONAL STATUS PRIOR TO ADMISSION: Patient was modified independent using a SB quad cane for functional mobility. HOME SUPPORT: The patient lived with  and required supervision/set-up for IADL tasks. Occupational Therapy Goals  Initiated 2022  1. Patient will perform bathing with modified independence within 7 day(s). 2.  Patient will perform upper body dressing with independence within 7 day(s). 3.  Patient will perform lower body dressing with independence within 7 day(s).   4.  Patient will perform toilet transfers with modified independence within 7 day(s). 5.  Patient will perform all aspects of toileting with independence within 7 day(s). 6.  Patient will participate in upper extremity therapeutic exercise/activities with independence for 10 minutes within 7 day(s). 7.  Patient will utilize energy conservation techniques during functional activities with verbal cues within 7 day(s).

## 2022-12-09 NOTE — DISCHARGE SUMMARY
HOSPITALIST DISCHARGE NOTE  Cristhian Trevino MD, [place], [place]       PATIENT ID: Alana Lynn  MRN: [id number]   YOB: 1955    DATE OF ADMISSION: 12/7/2022  9:45 AM    DATE OF DISCHARGE: 12/09/22    PRIMARY CARE PROVIDER: TIGRE Quezada     ATTENDING PHYSICIAN: Cristhian Trevino MD  DISCHARGING PROVIDER: Cristhian Trevino MD        CONSULTATIONS: None    PROCEDURES/SURGERIES: * No surgery found *    ADMITTING [address] [place] [address] COURSE:     Alana Lynn is a 75 y.o. female followed by Angy Dubon  and  has a past medical history of   GERD, hiatal hernia, diabetes, hypertension, hyperlipidemia who presents from home with increasing fatigue. Patient was diagnosed with flu a positive on 11/17 and since that time has had fatigue and aching all over denies any fevers but is having some cough and sinus pain as well as nausea with associated heartburn-like symptoms. She had some episodes of diarrhea but very few and she also notes that her  had similar symptoms and found to be flu positive but is doing better. On evaluation emergency room she noted to have a sodium 131 potassium 2.9 and magnesium of 1.1 albumin of 2.2 and a BUN/creatinine 11/0.7 8 repeat COVID/flu testing is negative her chest x-ray was also negative but with her noted leukocytosis initially urinalysis obtained after arrival to the care and noted to have 2+ bacteria and moderate leukocytes increased protein and glucose also noted. So patient was referred for admission to remote telemetry for UTI, hypomagnesemia, dehydration, hypokalemia generalized weakness. DISCHARGE DIAGNOSES / PLAN:      UTI  Urinalysis positive with 2+ bacteria and moderate leukocytes  Urine culture with mixed linnea thus far. Cont' Levaquin 750 mg IV daily x3 days.     Acute bronchitis/RSV bronchitis  Possible superimposed pneumonia as well.  Continues to have significant productive cough superimposed with RSV infection. Continue supportive care with antitussives as well as IV Levaquin. Patient is status post influenza infection just 1 week prior. Treating empirically with IV Levaquin for possible superimposed pneumonia with initial diagnosis of influenza just 1 week prior. Discharged home on cefdinir 300 mg twice daily along with doxycycline for an additional 7 days to complete a total of 10-day antibiotic course. Hypokalemia  Improved significantly with IV and p.o. repletion. Hypomagnesemia  Improved with IV and p.o. repletion. Continue Mag-Ox 400 Mg twice daily. Generalized weakness  Continues to have weakness however able to ambulate with physical therapy quite well. Will require home health with PT. Patient able to ambulate with walker quite well with PT however will require home health physical therapy assistance. Type 2 diabetes  A1c 10.7 noted. States she is allergic to regular insulin so we will do sliding scale and monitor closely  Patient started on Lantus 15 units nightly at least and continuing metformin and glipizide. Hypertension  Continue metoprolol succinate 25 mg daily     Hyperlipidemia  Continue statin     GERD  Continue home pantoprazole 40 mg daily as well as Mylanta with meals. Patient is stable enough to discharge home with home health for PT needs.     PENDING TEST RESULTS:   At the time of discharge the following test results are still pending: None    FOLLOW UP APPOINTMENTS:    Follow-up Information       Follow up With Specialties Details Why Contact Info    TIGRE Medina Nurse Practitioner Follow up 12/15/2022 @ 735 [phone] [facility]  [phone]               DIET: Diabetic Diet    ACTIVITY: Activity as tolerated      DISCHARGE MEDICATIONS:  Current Discharge Medication List        START taking these medications    Details   insulin glargine (LANTUS) 100 unit/mL injection 15 Units by SubCUTAneous route nightly for 30 days. Qty: 4.5 mL, Refills: 0  Start date: 12/9/2022, End date: 1/8/2023      melatonin, rapid dissolve, 5 mg TbDi tablet Take 2 Tablets by mouth nightly for 30 days. Qty: 60 Tablet, Refills: 0  Start date: 12/9/2022, End date: 1/8/2023      benzonatate (TESSALON) 100 mg capsule Take 1 Capsule by mouth three (3) times daily as needed for Cough for up to 7 days. Qty: 20 Capsule, Refills: 0  Start date: 12/9/2022, End date: 12/16/2022      cefdinir (OMNICEF) 300 mg capsule Take 1 Capsule by mouth two (2) times a day for 7 days. Indications: bacterial pneumonia caused by Streptococcus  Qty: 14 Capsule, Refills: 0  Start date: 12/10/2022, End date: 12/17/2022      doxycycline (MONODOX) 100 mg capsule Take 1 Capsule by mouth two (2) times a day for 7 days. Qty: 14 Capsule, Refills: 0  Start date: 12/9/2022, End date: 12/16/2022           CONTINUE these medications which have NOT CHANGED    Details   magnesium oxide (MAG-OX) 400 mg tablet Take 400 mg by mouth daily. omeprazole (PRILOSEC) 20 mg capsule Take 20 mg by mouth daily. Indications: gastroesophageal reflux disease      multivitamin (ONE A DAY) tablet Take 1 Tablet by mouth daily. cholecalciferol (Vitamin D3) (1000 Units /25 mcg) tablet Take 1,000 Units by mouth daily. metFORMIN (GLUCOPHAGE) 1,000 mg tablet Take 1,000 mg by mouth two (2) times daily (with meals). Rx ending 12/12/22      atorvastatin (LIPITOR) 10 mg tablet Take 10 mg by mouth daily. metoprolol succinate (TOPROL-XL) 25 mg XL tablet Take 25 mg by mouth daily. omeprazole (PRILOSEC OTC) 20 mg tablet Take 20 mg by mouth daily. nicotine (NICORETTE) 4 mg gum Take 4 mg by mouth as needed for Smoking Cessation. glipiZIDE SR (GLUCOTROL XL) 5 mg CR tablet Take 10 mg by mouth daily. ascorbic acid, vitamin C, (VITAMIN C) 250 mg tablet Take 250 mg by mouth.       aspirin delayed-release 81 mg tablet Take 81 mg by mouth daily. She says she takes 2 a day      cinnamon bark (CINNAMON PO) Take 1,200 mg by mouth daily. STOP taking these medications       ondansetron hcl (Zofran) 4 mg tablet Comments:   Reason for Stopping:         Magnesium Amino Acid Chelate 100 mg tab Comments:   Reason for Stopping:         potassium gluconate 2.5 mEq tab Comments:   Reason for Stopping:                 Recent Days:  Recent Labs     12/09/22 0528 12/08/22  0532 12/07/22  1000   WBC 9.9 14.6* 14.7*   HGB 10.7* 11.6 11.1*   HCT 34.2* 36.8 34.4*   * 589* 590*     Recent Labs     12/09/22 0528 12/08/22 0532 12/07/22  1000   * 134* 131*   K 4.3 4.0 2.9*    97 92*   CO2 29 32 33*   * 135* 186*   BUN 6 5* 11   CREA 0.64 0.69 0.78   CA 8.3* 8.5 8.8   MG  --  1.5* 1.1*   ALB  --   --  2.2*   TBILI  --   --  0.5   ALT  --   --  12     No results for input(s): PH, PCO2, PO2, HCO3, FIO2 in the last 72 hours.       Recent Results (from the past 336 hour(s))   EKG, 12 LEAD, INITIAL    Collection Time: 12/07/22  9:55 AM   Result Value Ref Range    Ventricular Rate 81 BPM    Atrial Rate 81 BPM    P-R Interval 143 ms    QRS Duration 115 ms    Q-T Interval 419 ms    QTC Calculation (Bezet) 487 ms    Calculated P Axis -10 degrees    Calculated R Axis -51 degrees    Calculated T Axis 43 degrees    Diagnosis       Sinus rhythm  Probable left atrial enlargement  Incomplete RBBB and LAFB  Left ventricular hypertrophy    Confirmed by [facility] ([id number] on 12/8/2022 9:07:55 AM     CBC WITH AUTOMATED DIFF    Collection Time: 12/07/22 10:00 AM   Result Value Ref Range    WBC 14.7 (H) 3.6 - 11.0 K/uL    RBC 4.10 3.80 - 5.20 M/uL    HGB 11.1 (L) 11.5 - 16.0 g/dL    HCT 34.4 (L) 35.0 - 47.0 %    MCV 83.9 80.0 - 99.0 FL    MCH 27.1 26.0 - 34.0 PG    MCHC 32.3 30.0 - 36.5 g/dL    RDW 13.8 11.5 - 14.5 %    PLATELET 136 (H) 544 - 400 K/uL    MPV 8.5 (L) 8.9 - 12.9 FL    NRBC 0.0 0.0  WBC    ABSOLUTE NRBC 0.00 0.00 - 0.01 K/uL    NEUTROPHILS 74 32 - 75 %    LYMPHOCYTES 17 12 - 49 %    MONOCYTES 8 5 - 13 %    EOSINOPHILS 0 0 - 7 %    BASOPHILS 0 0 - 1 %    IMMATURE GRANULOCYTES 1 (H) 0 - 0.5 %    ABS. NEUTROPHILS 10.9 (H) 1.8 - 8.0 K/UL    ABS. LYMPHOCYTES 2.5 0.8 - 3.5 K/UL    ABS. MONOCYTES 1.2 (H) 0.0 - 1.0 K/UL    ABS. EOSINOPHILS 0.0 0.0 - 0.4 K/UL    ABS. BASOPHILS 0.0 0.0 - 0.1 K/UL    ABS. IMM. GRANS. 0.1 (H) 0.00 - 0.04 K/UL    DF AUTOMATED     METABOLIC PANEL, COMPREHENSIVE    Collection Time: 12/07/22 10:00 AM   Result Value Ref Range    Sodium 131 (L) 136 - 145 mmol/L    Potassium 2.9 (L) 3.5 - 5.1 mmol/L    Chloride 92 (L) 97 - 108 mmol/L    CO2 33 (H) 21 - 32 mmol/L    Anion gap 6 5 - 15 mmol/L    Glucose 186 (H) 65 - 100 mg/dL    BUN 11 6 - 20 mg/dL    Creatinine 0.78 0.55 - 1.02 mg/dL    BUN/Creatinine ratio 14 12 - 20      eGFR >60 >60 ml/min/1.73m2    Calcium 8.8 8.5 - 10.1 mg/dL    Bilirubin, total 0.5 0.2 - 1.0 mg/dL    AST (SGOT) 12 (L) 15 - 37 U/L    ALT (SGPT) 12 12 - 78 U/L    Alk.  phosphatase 136 (H) 45 - 117 U/L    Protein, total 7.3 6.4 - 8.2 g/dL    Albumin 2.2 (L) 3.5 - 5.0 g/dL    Globulin 5.1 (H) 2.0 - 4.0 g/dL    A-G Ratio 0.4 (L) 1.1 - 2.2     MAGNESIUM    Collection Time: 12/07/22 10:00 AM   Result Value Ref Range    Magnesium 1.1 (L) 1.6 - 2.4 mg/dL   TROPONIN-HIGH SENSITIVITY    Collection Time: 12/07/22 10:00 AM   Result Value Ref Range    Troponin-High Sensitivity 10 0 - 51 ng/L   CULTURE, BLOOD    Collection Time: 12/07/22 10:00 AM    Specimen: Blood   Result Value Ref Range    Special Requests: No Special Requests      Culture result: No growth 2 days     LACTIC ACID    Collection Time: 12/07/22 10:00 AM   Result Value Ref Range    Lactic acid 1.4 0.4 - 2.0 mmol/L   NT-PRO BNP    Collection Time: 12/07/22 10:00 AM   Result Value Ref Range    NT pro- (H) <125 pg/mL   HEMOGLOBIN A1C WITH EAG    Collection Time: 12/07/22 10:00 AM   Result Value Ref Range    Hemoglobin A1c 10.7 (H) 4.0 - 5.6 %    Est. average glucose 260 mg/dL   CULTURE, BLOOD    Collection Time: 12/07/22 10:05 AM    Specimen: Blood   Result Value Ref Range    Special Requests: No Special Requests      Culture result: (A)       Staphylococcus species, coagulase negative growing in 1 of 2 bottles drawn No Site Indicated    Culture result:       (NOTE) [facility] CALLED RESULTS TO ESTEPHANIA Cho Rn at 80 by mht   URINALYSIS W/ REFLEX CULTURE    Collection Time: 12/07/22  1:27 PM    Specimen: Urine   Result Value Ref Range    Color Yellow/Straw      Appearance Clear Clear      Specific gravity 1.010 1.003 - 1.030      pH (UA) 7.0 5.0 - 8.0      Protein 30 (A) Negative mg/dL    Glucose 250 (A) Negative mg/dL    Ketone Negative Negative mg/dL    Bilirubin Negative Negative      Blood Negative Negative      Urobilinogen 1.0 0.2 - 1.0 EU/dL    Nitrites Negative Negative      Leukocyte Esterase Moderate (A) Negative      WBC 10-20 0 - 5 /hpf    RBC 0-5 0 - 3 /hpf    Bacteria 2+ (A) Negative /hpf    UA:UC IF INDICATED Urine Culture Ordered (A) Culture not indicated by UA result     CULTURE, URINE    Collection Time: 12/07/22  1:27 PM    Specimen: Urine   Result Value Ref Range    Special Requests: No Special Requests      [place] Count 15,000      [place] Count colonies/ml      Culture result: Mixed urogenital linnea isolated     INFLUENZA A & B AG (RAPID TEST)    Collection Time: 12/07/22  2:15 PM   Result Value Ref Range    Influenza A Antigen Negative Negative      Influenza B Antigen Negative Negative     RSV AG - RAPID    Collection Time: 12/07/22  2:15 PM   Result Value Ref Range    RSV Antigen Positive (A) Negative     GLUCOSE, POC    Collection Time: 12/07/22  3:32 PM   Result Value Ref Range    Glucose (POC) 211 (H) 65 - 100 mg/dL    Performed by Karlie Cantu    COVID-19 WITH INFLUENZA A/B    Collection Time: 12/07/22  4:00 PM   Result Value Ref Range    SARS-CoV-2 by PCR Not Detected Not Detected      Influenza A by PCR Not Detected Not Detected      Influenza B by PCR Not Detected Not Detected     METABOLIC PANEL, BASIC    Collection Time: 12/08/22  5:32 AM   Result Value Ref Range    Sodium 134 (L) 136 - 145 mmol/L    Potassium 4.0 3.5 - 5.1 mmol/L    Chloride 97 97 - 108 mmol/L    CO2 32 21 - 32 mmol/L    Anion gap 5 5 - 15 mmol/L    Glucose 135 (H) 65 - 100 mg/dL    BUN 5 (L) 6 - 20 mg/dL    Creatinine 0.69 0.55 - 1.02 mg/dL    BUN/Creatinine ratio 7 (L) 12 - 20      eGFR >60 >60 ml/min/1.73m2    Calcium 8.5 8.5 - 10.1 mg/dL   MAGNESIUM    Collection Time: 12/08/22  5:32 AM   Result Value Ref Range    Magnesium 1.5 (L) 1.6 - 2.4 mg/dL   CBC WITH AUTOMATED DIFF    Collection Time: 12/08/22  5:32 AM   Result Value Ref Range    WBC 14.6 (H) 3.6 - 11.0 K/uL    RBC 4.30 3.80 - 5.20 M/uL    HGB 11.6 11.5 - 16.0 g/dL    HCT 36.8 35.0 - 47.0 %    MCV 85.6 80.0 - 99.0 FL    MCH 27.0 26.0 - 34.0 PG    MCHC 31.5 30.0 - 36.5 g/dL    RDW 14.0 11.5 - 14.5 %    PLATELET 496 (H) 009 - 400 K/uL    MPV 8.6 (L) 8.9 - 12.9 FL    NRBC 0.0 0.0  WBC    ABSOLUTE NRBC 0.00 0.00 - 0.01 K/uL    NEUTROPHILS 78 (H) 32 - 75 %    LYMPHOCYTES 14 12 - 49 %    MONOCYTES 7 5 - 13 %    EOSINOPHILS 0 0 - 7 %    BASOPHILS 1 0 - 1 %    IMMATURE GRANULOCYTES 0 0 - 0.5 %    ABS. NEUTROPHILS 11.4 (H) 1.8 - 8.0 K/UL    ABS. LYMPHOCYTES 2.0 0.8 - 3.5 K/UL    ABS. MONOCYTES 1.1 (H) 0.0 - 1.0 K/UL    ABS. EOSINOPHILS 0.0 0.0 - 0.4 K/UL    ABS. BASOPHILS 0.1 0.0 - 0.1 K/UL    ABS. IMM.  GRANS. 0.1 (H) 0.00 - 0.04 K/UL    DF AUTOMATED     BLOOD CULTURE ID PANEL    Collection Time: 12/08/22 10:27 AM    Specimen: Blood   Result Value Ref Range    Acc. no. from Micro Order Blood      Enterococcus faecalis Not detected Not detected    Enterococcus faecium Not detected Not detected    Listeria monocytogenes Not detected Not detected    Staphylococcus Detected (A) Not detected      Staphylococcus aureus Not detected Not detected    Staph epidermidis Detected (A) Not detected      Staph lugdunensis Not detected Not detected    Streptococcus Not detected Not detected    Streptococcus agalactiae (Group B) Not detected Not detected    Streptococcus pneumoniae Not detected Not detected    Streptococcus pyogenes (Group A) Not detected Not detected    Acinetobacter calcoaceticus-baumanii complex Not detected Not detected    Bacteroides fragilis Not detected Not detected    Enterobacterales species Not detected Not detected    Enterobacter cloacae complex Not detected Not detected    Escherichia coli Not detected Not detected    Klebsiella aerogenes Not detected Not detected    Klebsiella oxytoca Not detected Not detected    Klebsiella pneumoniae group Not detected Not detected    Proteus Not detected Not detected    Salmonella Not detected Not detected    Serratia marcescens Not detected Not detected    Haemophilus influenzae Not detected Not detected    Neisseria meningitidis Not detected Not detected    Pseudomonas aeruginosa Not detected Not detected    Steno maltophilia Not detected Not detected    Candida albicans Not detected Not detected    Candida auris Not detected Not detected    Candida glabrata Not detected Not detected    Candida krusei Not detected Not detected    Candida parapsilosis Not detected Not detected    Candida tropicalis Not detected Not detected    Crypto neoformans/gattii Not detected Not detected    RESISTANT GENES:        MECA/C (Methicillin resistant gene) Not detected Not detected    Comment       False positive results may rarely occur.  Correlate with   GLUCOSE, POC    Collection Time: 12/08/22  7:42 PM   Result Value Ref Range    Glucose (POC) 257 (H) 65 - 100 mg/dL    Performed by Knute Cloud    METABOLIC PANEL, BASIC    Collection Time: 12/09/22  5:28 AM   Result Value Ref Range    Sodium 135 (L) 136 - 145 mmol/L    Potassium 4.3 3.5 - 5.1 mmol/L    Chloride 101 97 - 108 mmol/L    CO2 29 21 - 32 mmol/L    Anion gap 5 5 - 15 mmol/L    Glucose 185 (H) 65 - 100 mg/dL    BUN 6 6 - 20 mg/dL    Creatinine 0.64 0.55 - 1.02 mg/dL    BUN/Creatinine ratio 9 (L) 12 - 20      eGFR >60 >60 ml/min/1.73m2    Calcium 8.3 (L) 8.5 - 10.1 mg/dL   CBC WITH AUTOMATED DIFF    Collection Time: 12/09/22  5:28 AM   Result Value Ref Range    WBC 9.9 3.6 - 11.0 K/uL    RBC 3.98 3.80 - 5.20 M/uL    HGB 10.7 (L) 11.5 - 16.0 g/dL    HCT 34.2 (L) 35.0 - 47.0 %    MCV 85.9 80.0 - 99.0 FL    MCH 26.9 26.0 - 34.0 PG    MCHC 31.3 30.0 - 36.5 g/dL    RDW 14.0 11.5 - 14.5 %    PLATELET 216 (H) 476 - 400 K/uL    MPV 8.7 (L) 8.9 - 12.9 FL    NRBC 0.0 0.0  WBC    ABSOLUTE NRBC 0.00 0.00 - 0.01 K/uL    NEUTROPHILS 62 32 - 75 %    LYMPHOCYTES 26 12 - 49 %    MONOCYTES 9 5 - 13 %    EOSINOPHILS 2 0 - 7 %    BASOPHILS 1 0 - 1 %    IMMATURE GRANULOCYTES 0 0 - 0.5 %    ABS. NEUTROPHILS 6.3 1.8 - 8.0 K/UL    ABS. LYMPHOCYTES 2.5 0.8 - 3.5 K/UL    ABS. MONOCYTES 0.9 0.0 - 1.0 K/UL    ABS. EOSINOPHILS 0.2 0.0 - 0.4 K/UL    ABS. BASOPHILS 0.1 0.0 - 0.1 K/UL    ABS. IMM. GRANS. 0.0 0.00 - 0.04 K/UL    DF AUTOMATED          NOTIFY YOUR PHYSICIAN FOR ANY OF THE FOLLOWING:   Fever over 101 degrees for 24 hours. Chest pain, shortness of breath, fever, chills, nausea, vomiting, diarrhea, change in mentation, falling, weakness, bleeding. Severe pain or pain not relieved by medications. Or, any other signs or symptoms that you may have questions about. DISPOSITION:    Home With:   OT  PT  HH  RN       Long term SNF/Inpatient Rehab    Independent/assisted living    Hospice    Other:       PATIENT CONDITION AT DISCHARGE:     Functional status    Poor     Deconditioned     Independent      Cognition     Lucid     Forgetful     Dementia      Catheters/lines (plus indication)    Mata     PICC     PEG     None      Code status     Full code     DNR      PHYSICAL EXAMINATION AT DISCHARGE:  General:          Alert, cooperative, no distress, appears stated age.      HEENT:           Atraumatic, anicteric sclerae, pink conjunctivae No oral ulcers, mucosa moist, throat clear, dentition fair  Neck:               Supple, symmetrical  Lungs:             Clear to auscultation bilaterally. No Wheezing or Rhonchi. No rales. Chest wall:      No tenderness  No Accessory muscle use. Heart:              Regular  rhythm,  No  murmur   No edema  Abdomen:        Soft, non-tender. Not distended. Bowel sounds normal  Extremities:     No cyanosis. No clubbing,                            Skin turgor normal, Capillary refill normal  Skin:                Not pale. Not Jaundiced  No rashes   Psych:             Not anxious or agitated. Neurologic:      Alert, moves all extremities, answers questions appropriately and responds to commands       CHRONIC MEDICAL DIAGNOSES:  Problem List as of 12/9/2022 Date Reviewed: 10/23/2022            Codes Class Noted - Resolved    Hypokalemia ICD-10-CM: E87.6  ICD-9-CM: 276.8  12/7/2022 - Present        Hypomagnesemia ICD-10-CM: E83.42  ICD-9-CM: 275.2  12/7/2022 - Present        Leucocytosis ICD-10-CM: D72.829  ICD-9-CM: 288.60  12/7/2022 - Present        Hypoalbuminemia ICD-10-CM: E88.09  ICD-9-CM: 273.8  12/7/2022 - Present        General weakness ICD-10-CM: R53.1  ICD-9-CM: 780.79  12/7/2022 - Present        * (Principal) UTI (urinary tract infection) ICD-10-CM: N39.0  ICD-9-CM: 599.0  12/7/2022 - Present        Hiatal hernia ICD-10-CM: K44.9  ICD-9-CM: 553.3  10/14/2022 - Present        Diabetes ([facility] [address] ICD-10-CM: E11.9  ICD-9-CM: 250.00  10/14/2022 - Present        Nausea ICD-10-CM: R11.0  ICD-9-CM: 787.02  10/14/2022 - Present        Burning reflux ICD-10-CM: R12  ICD-9-CM: 787.1  10/14/2022 - Present    Overview Signed 10/14/2022  1:25 PM by Susana tirado for reflux             Former smoker ICD-10-CM: Z87.891  ICD-9-CM: V15.82  10/14/2022 - Present    Overview Signed 10/14/2022  1:25 PM by Ale Brown     Using OTC Nicorette.              RESOLVED: Influenza A ICD-10-CM: J10.1  ICD-9-CM: 487.1  12/7/2022 - 12/7/2022           Greater than 35 minutes were spent with the patient on counseling and coordination of care    Signed:   Nomi Bryant MD  12/9/2022  1:36 PM

## 2022-12-09 NOTE — ROUTINE PROCESS
The pt have been resting in bed with her Iv infusing. She is c/o of having a headache & requesting some tylenol. She was given 2 tabs po. Will monitor.

## 2022-12-10 NOTE — PROGRESS NOTES
Physician Progress Note      PATIENT:               Rajwinder Ware  CSN #:                  [id number]  :                       1955  ADMIT DATE:       2022 9:45 AM  DISCH DATE:        2022 3:22 PM  RESPONDING  PROVIDER #:        Ann Frederick MD          QUERY TEXT:    Pt admitted with UTI and RSV Bronchitis. Pt noted to have WBC >12 and HR>100 within 24 hours of admission. If possible, please document in the progress notes and discharge summary if you are evaluating and /or treating any of the following: The medical record reflects the following:  Risk Factors: 76 y/o, Bronchitis, UTI, RSV    Clinical Indicators:  UTI, RSV, Bronchitis  *  WBC 14.7 -> 14.6  *  HR>100 up to 114 within 24 hours of admission    Treatment: IV Levaquin and IVF, serial CBCs, UA and urine culture, blood cultures, lactic,    Thank you,  Viktoriya SHUKLAN, RN, CRCR  Please contact me for any questions or concerns regarding this query at [email or web address]  Options provided:  -- Sepsis, present on admission  -- Sepsis, developed following admission  -- UTI and bronchitis without Sepsis  -- Other - I will add my own diagnosis  -- Disagree - Not applicable / Not valid  -- Disagree - Clinically unable to determine / Unknown  -- Refer to Clinical Documentation Reviewer    PROVIDER RESPONSE TEXT:    This patient has sepsis which was present on admission.     Query created by: Satnam Black on 2022 11:40 AM      Electronically signed by:  Ann Frederick MD 12/10/2022 3:25 PM

## 2022-12-11 LAB
BACTERIA SPEC CULT: NORMAL
SPECIAL REQUESTS,SREQ: NORMAL

## 2022-12-13 LAB
BACTERIA SPEC CULT: NORMAL
SPECIAL REQUESTS,SREQ: NORMAL

## 2023-01-06 PROBLEM — N39.0 UTI (URINARY TRACT INFECTION): Status: RESOLVED | Noted: 2022-12-07 | Resolved: 2023-01-06

## 2023-05-11 ENCOUNTER — TELEPHONE (OUTPATIENT)
Facility: CLINIC | Age: 68
End: 2023-05-11

## 2023-05-11 NOTE — TELEPHONE ENCOUNTER
----- Message from Pauline Shelleymireya sent at 5/11/2023 10:06 AM EDT -----  Subject: Appointment Request    Reason for Call: New Patient/New to Provider Appointment needed: New   Patient Request Appointment    QUESTIONS    Reason for appointment request? No appointments available during search     Additional Information for Provider?  Please call to schedule an NP appt /   pt has Medicare / screen green / diabetic   ---------------------------------------------------------------------------  --------------  Bruno Deluca VDQV  [phone]; OK to leave message on voicemail  ---------------------------------------------------------------------------  --------------  SCRIPT ANSWERS  SANDEEPID Screen: Annie Mckay

## 2023-05-25 RX ORDER — GLIPIZIDE 5 MG/1
10 TABLET, FILM COATED, EXTENDED RELEASE ORAL DAILY
COMMUNITY
Start: 2022-07-07

## 2023-05-25 RX ORDER — ATORVASTATIN CALCIUM 10 MG/1
10 TABLET, FILM COATED ORAL DAILY
COMMUNITY

## 2023-05-25 RX ORDER — OMEPRAZOLE 20 MG/1
20 CAPSULE, DELAYED RELEASE ORAL DAILY
COMMUNITY

## 2023-05-25 RX ORDER — ASCORBIC ACID 250 MG
250 TABLET ORAL
COMMUNITY

## 2023-05-25 RX ORDER — OMEPRAZOLE 20 MG/1
20 TABLET, DELAYED RELEASE ORAL DAILY
COMMUNITY

## 2023-05-25 RX ORDER — METOPROLOL SUCCINATE 25 MG/1
25 TABLET, EXTENDED RELEASE ORAL DAILY
COMMUNITY

## 2023-05-25 RX ORDER — ASPIRIN 81 MG/1
81 TABLET ORAL DAILY
COMMUNITY

## 2023-05-25 RX ORDER — MAGNESIUM OXIDE 400 MG/1
400 TABLET ORAL DAILY
COMMUNITY

## 2023-07-26 ENCOUNTER — ANESTHESIA EVENT (OUTPATIENT)
Age: 68
End: 2023-07-26
Payer: MEDICARE

## 2023-08-09 ENCOUNTER — HOSPITAL ENCOUNTER (OUTPATIENT)
Age: 68
Setting detail: OUTPATIENT SURGERY
Discharge: HOME | End: 2023-08-09
Attending: OPHTHALMOLOGY | Admitting: OPHTHALMOLOGY
Payer: MEDICARE

## 2023-08-09 ENCOUNTER — ANESTHESIA (OUTPATIENT)
Age: 68
End: 2023-08-09
Payer: MEDICARE

## 2023-08-09 VITALS
SYSTOLIC BLOOD PRESSURE: 158 MMHG | TEMPERATURE: 97.8 F | HEART RATE: 85 BPM | RESPIRATION RATE: 16 BRPM | OXYGEN SATURATION: 98 % | DIASTOLIC BLOOD PRESSURE: 82 MMHG

## 2023-08-09 LAB
GLUCOSE BLD STRIP.AUTO-MCNC: 201 MG/DL (ref 70–110)
PERFORMED BY:: ABNORMAL

## 2023-08-09 PROCEDURE — V2632 POST CHMBR INTRAOCULAR LENS: HCPCS | Performed by: OPHTHALMOLOGY

## 2023-08-09 PROCEDURE — 2500000003 HC RX 250 WO HCPCS: Performed by: OPHTHALMOLOGY

## 2023-08-09 PROCEDURE — 6360000002 HC RX W HCPCS: Performed by: OPHTHALMOLOGY

## 2023-08-09 PROCEDURE — 6370000000 HC RX 637 (ALT 250 FOR IP): Performed by: OPHTHALMOLOGY

## 2023-08-09 PROCEDURE — 2580000003 HC RX 258: Performed by: NURSE ANESTHETIST, CERTIFIED REGISTERED

## 2023-08-09 PROCEDURE — 7100000010 HC PHASE II RECOVERY - FIRST 15 MIN: Performed by: OPHTHALMOLOGY

## 2023-08-09 PROCEDURE — 6360000002 HC RX W HCPCS

## 2023-08-09 PROCEDURE — 3700000000 HC ANESTHESIA ATTENDED CARE: Performed by: OPHTHALMOLOGY

## 2023-08-09 PROCEDURE — 3700000001 HC ADD 15 MINUTES (ANESTHESIA): Performed by: OPHTHALMOLOGY

## 2023-08-09 PROCEDURE — 3600000012 HC SURGERY LEVEL 2 ADDTL 15MIN: Performed by: OPHTHALMOLOGY

## 2023-08-09 PROCEDURE — 3600000002 HC SURGERY LEVEL 2 BASE: Performed by: OPHTHALMOLOGY

## 2023-08-09 PROCEDURE — 2709999900 HC NON-CHARGEABLE SUPPLY: Performed by: OPHTHALMOLOGY

## 2023-08-09 PROCEDURE — 82962 GLUCOSE BLOOD TEST: CPT

## 2023-08-09 DEVICE — IMPLANTABLE DEVICE: Type: IMPLANTABLE DEVICE | Site: LENS | Status: FUNCTIONAL

## 2023-08-09 RX ORDER — SODIUM CHLORIDE 0.9 % (FLUSH) 0.9 %
5-40 SYRINGE (ML) INJECTION PRN
Status: DISCONTINUED | OUTPATIENT
Start: 2023-08-09 | End: 2023-08-09 | Stop reason: HOSPADM

## 2023-08-09 RX ORDER — LIDOCAINE HYDROCHLORIDE 10 MG/ML
INJECTION, SOLUTION EPIDURAL; INFILTRATION; INTRACAUDAL; PERINEURAL PRN
Status: DISCONTINUED | OUTPATIENT
Start: 2023-08-09 | End: 2023-08-09 | Stop reason: ALTCHOICE

## 2023-08-09 RX ORDER — SODIUM CHLORIDE 9 MG/ML
INJECTION, SOLUTION INTRAVENOUS PRN
Status: DISCONTINUED | OUTPATIENT
Start: 2023-08-09 | End: 2023-08-09 | Stop reason: HOSPADM

## 2023-08-09 RX ORDER — MOXIFLOXACIN 5 MG/ML
SOLUTION/ DROPS OPHTHALMIC PRN
Status: DISCONTINUED | OUTPATIENT
Start: 2023-08-09 | End: 2023-08-09 | Stop reason: ALTCHOICE

## 2023-08-09 RX ORDER — SODIUM CHLORIDE 0.9 % (FLUSH) 0.9 %
5-40 SYRINGE (ML) INJECTION PRN
Status: CANCELLED | OUTPATIENT
Start: 2023-08-09

## 2023-08-09 RX ORDER — SODIUM CHLORIDE 0.9 % (FLUSH) 0.9 %
5-40 SYRINGE (ML) INJECTION EVERY 12 HOURS SCHEDULED
Status: DISCONTINUED | OUTPATIENT
Start: 2023-08-09 | End: 2023-08-09 | Stop reason: HOSPADM

## 2023-08-09 RX ORDER — SODIUM CHLORIDE 9 MG/ML
INJECTION, SOLUTION INTRAVENOUS PRN
Status: CANCELLED | OUTPATIENT
Start: 2023-08-09

## 2023-08-09 RX ORDER — EPINEPHRINE 1 MG/ML(1)
AMPUL (ML) INJECTION PRN
Status: DISCONTINUED | OUTPATIENT
Start: 2023-08-09 | End: 2023-08-09 | Stop reason: ALTCHOICE

## 2023-08-09 RX ORDER — SODIUM CHLORIDE, SODIUM LACTATE, POTASSIUM CHLORIDE, CALCIUM CHLORIDE 600; 310; 30; 20 MG/100ML; MG/100ML; MG/100ML; MG/100ML
INJECTION, SOLUTION INTRAVENOUS CONTINUOUS
Status: DISCONTINUED | OUTPATIENT
Start: 2023-08-09 | End: 2023-08-09 | Stop reason: HOSPADM

## 2023-08-09 RX ORDER — SODIUM CHLORIDE 0.9 % (FLUSH) 0.9 %
5-40 SYRINGE (ML) INJECTION EVERY 12 HOURS SCHEDULED
Status: CANCELLED | OUTPATIENT
Start: 2023-08-09

## 2023-08-09 RX ORDER — ONDANSETRON 2 MG/ML
4 INJECTION INTRAMUSCULAR; INTRAVENOUS
Status: CANCELLED | OUTPATIENT
Start: 2023-08-09 | End: 2023-08-10

## 2023-08-09 RX ORDER — TETRACAINE HYDROCHLORIDE 5 MG/ML
SOLUTION OPHTHALMIC PRN
Status: DISCONTINUED | OUTPATIENT
Start: 2023-08-09 | End: 2023-08-09 | Stop reason: ALTCHOICE

## 2023-08-09 RX ORDER — MIDAZOLAM HYDROCHLORIDE 1 MG/ML
INJECTION INTRAMUSCULAR; INTRAVENOUS PRN
Status: DISCONTINUED | OUTPATIENT
Start: 2023-08-09 | End: 2023-08-09 | Stop reason: SDUPTHER

## 2023-08-09 RX ADMIN — Medication: at 09:18

## 2023-08-09 RX ADMIN — Medication 5 ML: at 10:27

## 2023-08-09 RX ADMIN — MIDAZOLAM HYDROCHLORIDE 1 MG: 2 INJECTION, SOLUTION INTRAMUSCULAR; INTRAVENOUS at 10:26

## 2023-08-09 ASSESSMENT — PAIN - FUNCTIONAL ASSESSMENT: PAIN_FUNCTIONAL_ASSESSMENT: NONE - DENIES PAIN

## 2023-08-09 NOTE — OP NOTE
and the anterior chamber were deepened with viscoelastic material. The bag was inspected and found to be free of any tears or defects. The intraocular lens implant listed below was placed into the capsular bag with good centration. Irrigation and aspiration was used to remove the residual viscoelastic material from the capsular bag and the anterior chamber and the incision sites were hydrated the BSS and cannula. The incisions were rehydrated and inspected with a weck-tony sponge and were found to be water tight. The eye was inspected and the anterior chamber was deep, the pupil round, and the lens was in good position. The lid speculum was then removed under visualization. Several drops of antibiotic eye drops were instilled into patient's operated eye. The operated eye was then covered with an eyeshield. The patient tolerated the procedure well and was transferred to recovery in good condition. Implant Name Type Inv.  Item Serial No.  Lot No. LRB No. Used Action   LENS IOL Diane Gallardo [id number] - [id number]  [facility]5 [id number]  [id number] [facility] [id number] Left 1 Implanted       Mauro Loera MD  08/09/23 11:41 AM

## 2023-08-09 NOTE — H&P
Day of Surgery H&P:  Adam Oropeza was seen and examined. There have been no significant clinical changes since the completion of the exam in the office. Pt continues to complain of persistent poor vision and/or glare in planned operative eye affecting ability to perform basic ADLs (such as reading or driving at night). Past Medical History:   Diagnosis Date    Burning reflux 10/14/2022    Diabetes ([address]) 10/14/2022    Former smoker 10/14/2022    Using OTC Nicorette. Hiatal hernia 10/14/2022    Nausea 10/14/2022       Family Medical History: Non-contributory    HOME MED LIST:  Prior to Admission medications    Medication Sig Start Date End Date Taking? Authorizing Provider   ascorbic acid (VITAMIN C) 250 MG tablet Take 1 tablet by mouth    Ar Automatic Reconciliation   aspirin 81 MG EC tablet Take 1 tablet by mouth daily    Ar Automatic Reconciliation   atorvastatin (LIPITOR) 10 MG tablet Take 1 tablet by mouth daily    Ar Automatic Reconciliation   vitamin D (CHOLECALCIFEROL) 25 MCG (1000 UT) TABS tablet Take 1 tablet by mouth daily    Ar Automatic Reconciliation   glipiZIDE (GLUCOTROL XL) 5 MG extended release tablet Take 2 tablets by mouth daily 7/7/22   Ar Automatic Reconciliation   magnesium oxide (MAG-OX) 400 MG tablet Take 1 tablet by mouth daily    Ar Automatic Reconciliation   metoprolol succinate (TOPROL XL) 25 MG extended release tablet Take 1 tablet by mouth daily    Ar Automatic Reconciliation   nicotine polacrilex (NICORETTE) 4 MG gum Take 1 each by mouth as needed    Ar Automatic Reconciliation   omeprazole (PRILOSEC) 20 MG delayed release capsule Take 1 capsule by mouth daily    Ar Automatic Reconciliation       Allergies   Allergen Reactions    Codeine      Other reaction(s): Unknown (comments)    Penicillins Other (See Comments)       Review of Systems  Constitutional:  Negative for chills and fever. HENT:  Negative for congestion and sore throat.     Eyes:  Positive for blurred

## 2023-08-09 NOTE — DISCHARGE INSTRUCTIONS
POST-OPERATIVE INSTRUCTIONS FOR CATARACT SURGERY     1. No heavy lifting (no more than 5 pounds). No Bending at waist and No strenuous activity for one (1) week. 2. DO NOT RUB YOUR EYE!!! Wear eye protection at all times when going outdoors (glasses, sunglasses,        ect) and wear shield while sleeping/napping for the first week after surgery. 3. DO NOT GET WATER IN YOUR EYES FOR THE NEXT 3 DAYS. You may gently clean your face and you        may shower, but don't put any pressure on the eye. Ladies, no eye makeup for one (1) week after surgery. Do not swim or get into a hot tub for three (3) weeks. 4. You may experience eye irritation, aching, itching and blurred vision for several days. Use Tylenol for         Discomfort but DO NOT RUB YOUR EYE . 5. Call your doctor with any increased pain, redness, nausea, vomiting, or worsening vision. Also call your doctor        with new flashes of light, many new floaters or a curtain/veil coming into your vision.                                                [address] YOUR HANDS BEFORE PUTTING IN YOUR DROPS                                       ALLOW 5 MINUTES BETWEEN DIFFERENT DROPS                       IF YOU HAVE ANY QUESTION OR CONCERNS DURING OFFICE HOUR                      CALL [phone] OR [phone] AFTER HOURS OR ON WEEKENDS

## 2023-08-09 NOTE — ANESTHESIA POSTPROCEDURE EVALUATION
Department of Anesthesiology  Postprocedure Note    Patient: Brisa Mejia  MRN: [id number]  YOB: 1955  Date of evaluation: 8/9/2023      Procedure Summary     Date: 08/09/23 Room / Location: [facility] MAIN 02 / [facility] MAIN OR    Anesthesia Start: 1018 Anesthesia Stop: 8566    Procedure: PHACO IOL OS (Left: Eye) Diagnosis:       Nuclear sclerotic cataract of left eye      (Nuclear sclerotic cataract of left eye [H25.12])    Surgeons: Kathi White MD Responsible Provider: KARLIE Miller CRNA    Anesthesia Type: MAC ASA Status: 3          Anesthesia Type: MAC    Brando Phase I:      Brando Phase II: Brando Score: 10      Anesthesia Post Evaluation    Patient location during evaluation: PACU  Patient participation: complete - patient participated  Level of consciousness: awake  Pain score: 0  Airway patency: patent  Nausea & Vomiting: no nausea and no vomiting  Complications: no  Cardiovascular status: hemodynamically stable  Respiratory status: acceptable and spontaneous ventilation  Hydration status: stable  Comments: Ok to discharge once criteria met  Multimodal analgesia pain management approach  Pain management: adequate

## (undated) DEVICE — NDL CNTR 40CT FM MAG: Brand: MEDLINE INDUSTRIES, INC.

## (undated) DEVICE — SKIN MARKER,REGULAR TIP WITH RULER: Brand: DEVON

## (undated) DEVICE — GLOVE SURG SZ 65 THK91MIL LTX FREE SYN POLYISOPRENE

## (undated) DEVICE — SOLUTION IRRIG 500ML STRL H2O NONPYROGENIC

## (undated) DEVICE — GOWN,AURORA,FABRIC-REINFORCED,X-LARGE: Brand: MEDLINE